# Patient Record
Sex: FEMALE | Race: WHITE | HISPANIC OR LATINO | Employment: STUDENT | URBAN - METROPOLITAN AREA
[De-identification: names, ages, dates, MRNs, and addresses within clinical notes are randomized per-mention and may not be internally consistent; named-entity substitution may affect disease eponyms.]

---
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
## 2023-01-12 ENCOUNTER — OFFICE VISIT (OUTPATIENT)
Dept: OBGYN CLINIC | Facility: CLINIC | Age: 18
End: 2023-01-12

## 2023-01-12 ENCOUNTER — NURSE TRIAGE (OUTPATIENT)
Dept: OTHER | Facility: OTHER | Age: 18
End: 2023-01-12

## 2023-01-12 VITALS
DIASTOLIC BLOOD PRESSURE: 64 MMHG | SYSTOLIC BLOOD PRESSURE: 122 MMHG | BODY MASS INDEX: 23.75 KG/M2 | HEIGHT: 61 IN | WEIGHT: 125.8 LBS

## 2023-01-12 DIAGNOSIS — R10.2 PELVIC PAIN: ICD-10-CM

## 2023-01-12 DIAGNOSIS — N94.9 VAGINAL DISCOMFORT: ICD-10-CM

## 2023-01-12 DIAGNOSIS — R39.9 URINARY SYMPTOM OR SIGN: Primary | ICD-10-CM

## 2023-01-12 DIAGNOSIS — Z30.09 ENCOUNTER FOR OTHER GENERAL COUNSELING AND ADVICE ON CONTRACEPTION: ICD-10-CM

## 2023-01-12 DIAGNOSIS — Z11.3 SCREEN FOR STD (SEXUALLY TRANSMITTED DISEASE): ICD-10-CM

## 2023-01-12 PROBLEM — G43.109 MIGRAINE WITH AURA: Status: ACTIVE | Noted: 2023-01-12

## 2023-01-12 RX ORDER — NITROFURANTOIN 25; 75 MG/1; MG/1
100 CAPSULE ORAL 2 TIMES DAILY
Qty: 10 CAPSULE | Refills: 0 | Status: SHIPPED | OUTPATIENT
Start: 2023-01-12 | End: 2023-01-17

## 2023-01-12 NOTE — PROGRESS NOTES
Assessment/Plan:       Diagnoses and all orders for this visit:    Urinary symptom or sign  -     nitrofurantoin (MACROBID) 100 mg capsule; Take 1 capsule (100 mg total) by mouth 2 (two) times a day for 5 days  -     Urine culture    Pelvic pain  -     Chlamydia/GC amplified DNA by PCR  -     VAGINOSIS DNA PROBE (AFFIRM)    Vaginal discomfort  -     VAGINOSIS DNA PROBE (AFFIRM)    Screen for STD (sexually transmitted disease)  -     Chlamydia/GC amplified DNA by PCR  -     VAGINOSIS DNA PROBE (AFFIRM)    Encounter for other general counseling and advice on contraception      20-year-old female presenting today for urinary and vulvovaginal symptoms as in HPI  Patient report recent unprotected sex for the first time  Primary complaint urinary symptoms, relief with Azo suggesting possible UTI  Urine dip not performed secondary to discoloration of urine from Azo  We will send out urine culture  Will start patient on Macrobid twice daily x5 days  Patient also noting some vulvovaginal symptoms  Some mild erythema without lesion or swelling noted at vaginal introitus, slightly at urethral opening without pus or discharge  Possibly from friction/irritation first time penetration? Recommend affirm and GC/CT cultures to rule out infection  Patient agreeable  Will treat according to results  Discussion had with patient regarding contraception and STD prevention  Patient interested in IUD  All forms of prescribe birth control including oral, ring, patch, Depo-Provera, IUD Nexplanon discussed with risk versus benefits, possible side effects, contraindications, expected bleeding patterns and procedure all reviewed with her today  Patient interested in possible copper IUD, which was discussed in further detail with patient  Patient does report migraine at times with visual floaters concerning for aura, would not advise combination hormonal at this time    She states she does have an appointment to discuss further with her neurologist   Patient also found to have pituitary mass/suspected microadenoma and is also following up with neurology for this as well  Neg hormonal workup last year  Patient given information handout for Devorah Mueller and Miracle Martinez as well as information for contacting insurance to ensure coverage once she makes a decision  Then she can contact office to schedule appointment for procedure  Also discussed with patient that prescription contraception does not protect against STDs and consistent condom use recommended  All patient questions answered  Will plan follow-up with patient once test results are back and appropriate treatment given  Patient to schedule appointment after deciding contraception method  Patient satisfied  Chief Complaint   Patient presents with   • UTI symptoms     Pt c/o pressure after voiding, urinary urgency, pelvic pain and lower back pain, pt also c/o vaginal discomfort  • Pelvic Pain       Subjective:      Patient ID: Paolo Bui is a 21 y o  female  24y/o F here today for concern for UTI sxs and vaginal sxs  She is new to our practice but has been seen within [address] [place] [address] OB/GYN and [place] previously  States had unprotected sex for first time a few days ago  Since then having urinary sxs, urgency, cloudy urine, odor in urine, frequency, pressure/discomfort, nausea  States has discomfort in vagina  Denies odor or abnormal discharge  Some pelvic cramping/pain  Had unprotected sex fr first time, no condom use  Not using anything for contraception  Is interested in possible IUD  She is taking Azo since last night  Giving some relief  The following portions of the patient's history were reviewed and updated as appropriate: allergies, current medications, past medical history, past social history and problem list     Review of Systems   Constitutional: Negative  Respiratory: Negative  Cardiovascular: Negative  Gastrointestinal: Negative  Genitourinary:        As in HPI         Objective:      /64 (BP Location: Left arm, Patient Position: Sitting, Cuff Size: Adult)   Ht 5' 1" (1 549 m)   Wt 57 1 kg (125 lb 12 8 oz)   LMP 01/06/2023 (Exact Date)   BMI 23 77 kg/m²          Physical Exam  Vitals reviewed  Constitutional:       Appearance: Normal appearance  Abdominal:      General: Abdomen is flat  Palpations: Abdomen is soft  Tenderness: There is no abdominal tenderness  Genitourinary:     Labia:         Right: No rash or lesion  Left: No rash or lesion  Vagina: Vaginal discharge (minimal thin yellow/green discharge) and tenderness (minimal generalized vaginal discomfort with speculum exam  ) present  No erythema or bleeding  Cervix: Erythema (minimal erythema noted localized around cervical os) present  No cervical motion tenderness, discharge, friability, lesion or cervical bleeding  Uterus: Not tender  Adnexa:         Right: No tenderness  Left: No tenderness  Comments: Minimal erythema at vaginal introitus without lesion, bleeding or open sores  Also minimal erythema noted at urethral opening no discharge noted  Pt experiencing minimal discomfort with speculum exam  Lymphadenopathy:      Head:      Right side of head: No submandibular or tonsillar adenopathy  Left side of head: No submandibular or tonsillar adenopathy  Neurological:      Mental Status: She is alert and oriented to person, place, and time  Psychiatric:         Mood and Affect: Mood normal          Behavior: Behavior normal  Behavior is cooperative

## 2023-01-12 NOTE — TELEPHONE ENCOUNTER
Patient was transferred to the office to schedule an appointment  Reason for Disposition  • All other females with painful urination, or patient wants to be seen    Answer Assessment - Initial Assessment Questions  1  SEVERITY: "How bad is the pain?"  (e g , Scale 1-10; mild, moderate, or severe)    - MILD (1-3): complains slightly about urination hurting    - MODERATE (4-7): interferes with normal activities      - SEVERE (8-10): excruciating, unwilling or unable to urinate because of the pain       "It is just really strong discomfort "   2  FREQUENCY: "How many times have you had painful urination today?"       2  3  PATTERN: "Is pain present every time you urinate or just sometimes?"       "Just after I am done peeing I get the discomfort and then it goes away "   4  ONSET: "When did the painful urination start?"       Monday   5  FEVER: "Do you have a fever?" If Yes, ask: "What is your temperature, how was it measured, and when did it start?"      Chills, has not taken temperature   6  PAST UTI: "Have you had a urine infection before?" If Yes, ask: "When was the last time?" and "What happened that time?"       "A lot time ago when I was younger "   7  CAUSE: "What do you think is causing the painful urination?"  (e g , UTI, scratch, Herpes sore)      UTI   8  OTHER SYMPTOMS: "Do you have any other symptoms?" (e g , flank pain, vaginal discharge, genital sores, urgency, blood in urine)      Strong odor, cloudy, urgency, frequency, abdominal cramping, lower back pain, blood in urine "I have been getting off my period  I am assuming that is why "   9   PREGNANCY: "Is there any chance you are pregnant?" "When was your last menstrual period?"      Last week     Started taking Azo last night    Protocols used: URINATION PAIN - FEMALE-ADULT-OH

## 2023-01-12 NOTE — TELEPHONE ENCOUNTER
Regarding: possible UTI  ----- Message from Betzaida Mckee sent at 1/12/2023  8:20 AM EST -----  " I am having discomfort and frequency with urination, cloudy and smelly urine   I would like an appt today "

## 2023-01-13 LAB
C TRACH DNA SPEC QL NAA+PROBE: NEGATIVE
CANDIDA RRNA VAG QL PROBE: NEGATIVE
G VAGINALIS RRNA GENITAL QL PROBE: POSITIVE
N GONORRHOEA DNA SPEC QL NAA+PROBE: NEGATIVE
T VAGINALIS RRNA GENITAL QL PROBE: NEGATIVE

## 2023-01-14 LAB — BACTERIA UR CULT: ABNORMAL

## 2023-01-16 DIAGNOSIS — B96.89 BV (BACTERIAL VAGINOSIS): Primary | ICD-10-CM

## 2023-01-16 DIAGNOSIS — N76.0 BV (BACTERIAL VAGINOSIS): Primary | ICD-10-CM

## 2023-01-16 RX ORDER — METRONIDAZOLE 7.5 MG/G
1 GEL VAGINAL
Qty: 70 G | Refills: 0 | Status: SHIPPED | OUTPATIENT
Start: 2023-01-16 | End: 2023-01-21

## 2023-01-24 DIAGNOSIS — R39.9 URINARY SYMPTOM OR SIGN: Primary | ICD-10-CM

## 2023-01-24 RX ORDER — NITROFURANTOIN 25; 75 MG/1; MG/1
100 CAPSULE ORAL 2 TIMES DAILY
Qty: 10 CAPSULE | Refills: 0 | Status: SHIPPED | OUTPATIENT
Start: 2023-01-24 | End: 2023-01-29

## 2023-02-17 DIAGNOSIS — B96.89 BV (BACTERIAL VAGINOSIS): Primary | ICD-10-CM

## 2023-02-17 DIAGNOSIS — N76.0 BV (BACTERIAL VAGINOSIS): Primary | ICD-10-CM

## 2023-02-17 RX ORDER — METRONIDAZOLE 7.5 MG/G
1 GEL VAGINAL
Qty: 70 G | Refills: 0 | Status: SHIPPED | OUTPATIENT
Start: 2023-02-17 | End: 2023-02-22

## 2023-02-22 ENCOUNTER — OFFICE VISIT (OUTPATIENT)
Dept: OBGYN CLINIC | Facility: CLINIC | Age: 18
End: 2023-02-22

## 2023-02-22 VITALS
WEIGHT: 128.2 LBS | DIASTOLIC BLOOD PRESSURE: 60 MMHG | HEIGHT: 61 IN | BODY MASS INDEX: 24.2 KG/M2 | SYSTOLIC BLOOD PRESSURE: 122 MMHG

## 2023-02-22 DIAGNOSIS — Z11.3 SCREENING EXAMINATION FOR STD (SEXUALLY TRANSMITTED DISEASE): ICD-10-CM

## 2023-02-22 DIAGNOSIS — Z30.09 COUNSELING FOR BIRTH CONTROL REGARDING INTRAUTERINE DEVICE (IUD): Primary | ICD-10-CM

## 2023-02-22 DIAGNOSIS — N88.2 CERVICAL STENOSIS (UTERINE CERVIX): ICD-10-CM

## 2023-02-22 RX ORDER — MISOPROSTOL 200 UG/1
600 TABLET ORAL ONCE
Qty: 3 TABLET | Refills: 0 | Status: SHIPPED | OUTPATIENT
Start: 2023-02-22 | End: 2023-02-22

## 2023-02-22 NOTE — PROGRESS NOTES
Subjective      Terese Mcleod is a 21 y o  female who presents for contraception counseling  She is interested in the IUD and would like information on the difference between the options available  The patient has no complaints today  The patient is sexually active  Pertinent past medical history: none  Menstrual History:  OB History        0    Para   0    Term   0       0    AB   0    Living   0       SAB   0    IAB   0    Ectopic   0    Multiple   0    Live Births   0                  Patient's last menstrual period was 2023 (exact date)  The following portions of the patient's history were reviewed and updated as appropriate: allergies, current medications, past family history, past medical history, past social history, past surgical history and problem list     Review of Systems  Pertinent items are noted in HPI  Objective      /60   Ht 5' 1" (1 549 m)   Wt 58 2 kg (128 lb 3 2 oz)   LMP 2023 (Exact Date)   BMI 24 22 kg/m²       Assessment and Plan      Terese was seen today for contraception  Diagnoses and all orders for this visit:    Counseling for birth control regarding intrauterine device (IUD)    Cervical stenosis (uterine cervix)  -     miSOPROStol (Cytotec) 200 mcg tablet; Take 3 tablets (600 mcg total) by mouth 1 (one) time for 1 dose    Screening examination for STD (sexually transmitted disease)  -     Chlamydia/GC amplified DNA by PCR      Reviewed difference between Neville Edinson and Mirena  I explained they are all effective for prevention of pregnancy  She has opted for the Mirena IUD  She is due for her menses next week  Urine sent for STD screening  Cytotec prescribed for cervical ripening, to be take the night before insertion  She was also instructed to take 600 mg of Ibuprofen the night before and morning of insertion  As well as to ensure that she eats prior to her procedure  Patient will return on 3/2 for IUD insertion

## 2023-02-22 NOTE — Clinical Note
Please process a mirena IUD for patient  She will be returning next week Thursday 3/2 for insertion

## 2023-02-23 LAB
C TRACH DNA SPEC QL NAA+PROBE: NEGATIVE
N GONORRHOEA DNA SPEC QL NAA+PROBE: NEGATIVE

## 2023-02-27 ENCOUNTER — TELEPHONE (OUTPATIENT)
Dept: NEUROLOGY | Facility: CLINIC | Age: 18
End: 2023-02-27

## 2023-07-12 ENCOUNTER — APPOINTMENT (OUTPATIENT)
Dept: LAB | Facility: CLINIC | Age: 18
End: 2023-07-12
Payer: COMMERCIAL

## 2023-07-12 ENCOUNTER — OFFICE VISIT (OUTPATIENT)
Dept: OBGYN CLINIC | Facility: CLINIC | Age: 18
End: 2023-07-12

## 2023-07-12 VITALS — DIASTOLIC BLOOD PRESSURE: 74 MMHG | BODY MASS INDEX: 23.73 KG/M2 | SYSTOLIC BLOOD PRESSURE: 106 MMHG | WEIGHT: 125.6 LBS

## 2023-07-12 DIAGNOSIS — Z30.09 COUNSELING FOR INITIATION OF BIRTH CONTROL METHOD: ICD-10-CM

## 2023-07-12 DIAGNOSIS — Z11.3 SCREEN FOR STD (SEXUALLY TRANSMITTED DISEASE): ICD-10-CM

## 2023-07-12 DIAGNOSIS — N89.8 VAGINAL DISCHARGE: ICD-10-CM

## 2023-07-12 DIAGNOSIS — N76.0 ACUTE VAGINITIS: Primary | ICD-10-CM

## 2023-07-12 LAB
BV WHIFF TEST VAG QL: POSITIVE
CLUE CELLS SPEC QL WET PREP: POSITIVE
HBV SURFACE AG SER QL: NORMAL
HCV AB SER QL: NORMAL
HIV 1+2 AB+HIV1 P24 AG SERPL QL IA: NORMAL
HIV 2 AB SERPL QL IA: NORMAL
HIV1 AB SERPL QL IA: NORMAL
HIV1 P24 AG SERPL QL IA: NORMAL
PH SMN: 5 [PH]
T VAGINALIS VAG QL WET PREP: ABNORMAL
TREPONEMA PALLIDUM IGG+IGM AB [PRESENCE] IN SERUM OR PLASMA BY IMMUNOASSAY: NORMAL
YEAST VAG QL WET PREP: POSITIVE

## 2023-07-12 PROCEDURE — 87591 N.GONORRHOEAE DNA AMP PROB: CPT | Performed by: PHYSICIAN ASSISTANT

## 2023-07-12 PROCEDURE — 86803 HEPATITIS C AB TEST: CPT

## 2023-07-12 PROCEDURE — 36415 COLL VENOUS BLD VENIPUNCTURE: CPT

## 2023-07-12 PROCEDURE — 87491 CHLMYD TRACH DNA AMP PROBE: CPT | Performed by: PHYSICIAN ASSISTANT

## 2023-07-12 PROCEDURE — 87389 HIV-1 AG W/HIV-1&-2 AB AG IA: CPT

## 2023-07-12 PROCEDURE — 87340 HEPATITIS B SURFACE AG IA: CPT

## 2023-07-12 PROCEDURE — 86780 TREPONEMA PALLIDUM: CPT

## 2023-07-12 RX ORDER — FLUCONAZOLE 150 MG/1
TABLET ORAL
Qty: 2 TABLET | Refills: 0 | Status: SHIPPED | OUTPATIENT
Start: 2023-07-12 | End: 2023-07-17

## 2023-07-12 RX ORDER — METRONIDAZOLE 500 MG/1
500 TABLET ORAL EVERY 12 HOURS SCHEDULED
Qty: 14 TABLET | Refills: 0 | Status: SHIPPED | OUTPATIENT
Start: 2023-07-12 | End: 2023-07-19

## 2023-07-12 NOTE — PROGRESS NOTES
Terese Castroya Abdifatah [facility]  2005      CC:  "vaginal discharge" x 1 week     S:  21 y.o. female here with c/o thick white to yellow vaginal discharge, internal itching, and vaginal irritation. Reports faint odor, like something is off, but not fishy or metallic. No pelvic pain or urinary sx. Last sexually active in April 2023. Reports male partners only. Uses withdrawal for contraception. Is interested in being on birth control but would like to wait until she becomes active again. No current outpatient medications on file. Social History     Socioeconomic History   • Marital status: Single     Spouse name: Not on file   • Number of children: Not on file   • Years of education: Not on file   • Highest education level: Not on file   Occupational History   • Not on file   Tobacco Use   • Smoking status: Never   • Smokeless tobacco: Never   Vaping Use   • Vaping Use: Never used   Substance and Sexual Activity   • Alcohol use: Never   • Drug use: Never   • Sexual activity: Yes     Partners: Male     Birth control/protection: None   Other Topics Concern   • Not on file   Social History Narrative   • Not on file     Social Determinants of Health     Financial Resource Strain: Low Risk  (9/20/2022)    Overall Financial Resource Strain (CARDIA)    • Difficulty of Paying Living Expenses: Not hard at all   Food Insecurity: No Food Insecurity (9/20/2022)    Hunger Vital Sign    • Worried About Running Out of Food in the Last Year: Never true    • Ran Out of Food in the Last Year: Never true   Transportation Needs: No Transportation Needs (9/20/2022)    PRAPARE - Transportation    • Lack of Transportation (Medical): No    • Lack of Transportation (Non-Medical):  No   Physical Activity: Sufficiently Active (6/2/2022)    Exercise Vital Sign    • Days of Exercise per Week: 3 days    • Minutes of Exercise per Session: 60 min   Stress: No Stress Concern Present (6/2/2022)    [employer] Stress Questionnaire    • Feeling of Stress : Not at all   Social Connections: Socially Isolated (6/2/2022)    Social Connection and Isolation Panel [NHANES]    • Frequency of Communication with Friends and Family: More than three times a week    • Frequency of Social Gatherings with Friends and Family: More than three times a week    • Attends Mosque Services: Never    • Active Member of Clubs or Organizations: No    • Attends Club or Organization Meetings: Never    • Marital Status: Never    Intimate Partner Violence: Not At Risk (6/2/2022)    Humiliation, Afraid, Rape, and Kick questionnaire    • Fear of Current or Ex-Partner: No    • Emotionally Abused: No    • Physically Abused: No    • Sexually Abused: No   Housing Stability: Unknown (6/2/2022)    Housing Stability Vital Sign    • Unable to Pay for Housing in the Last Year: No    • Number of Places Lived in the Last Year: Not on file    • Unstable Housing in the Last Year: No     Family History   Problem Relation Age of Onset   • Breast cancer Mother    • Seizures Mother    • Migraines Mother    • No Known Problems Father    • No Known Problems Brother    • No Known Problems Brother    • Thyroid disease Maternal Grandmother    • Diabetes Maternal Grandmother    • Hypertension Maternal Grandmother    • Thyroid disease Paternal Aunt    • Diabetes Paternal Uncle    • Colon cancer Neg Hx    • Ovarian cancer Neg Hx      Past Medical History:   Diagnosis Date   • Anxiety    • Bipolar disorder ([address])    • Congenital jaw deformity     surgically corrected 2020   • Migraine    • Pituitary tumor    • Scoliosis        ROS:  Constitutional: Negative. Gastrointestinal: Negative for abdominal pain nausea, vomiting, and change in bowel habits. Genitourinary:  + discharge, itching, irritation, faint odor. Negative for difficulty urinating, frequency, urgency, pelvic pain.   O:  Blood pressure 106/74, weight 57 kg (125 lb 9.6 oz), last menstrual period 06/25/2023, not currently breastfeeding. Recent Results (from the past 1 hour(s))   POCT wet mount    Collection Time: 07/12/23  9:38 AM   Result Value Ref Range    Yeast, Wet Prep Positive     pH 5     Whiff Test Positive     Clue Cells Positive     Trich, Wet Prep Neg        Patient appears well and is not in distress  Normocephalic, atraumatic. Abdomen is soft and nontender without masses. External genitals are normal without lesions or rashes. Vagina with + thick white vaginal discharge. Pain/irritation reported on speculum exam. No bleeding or erythema. Cervix is normal without discharge or lesion. No CMT. Uterus is normal, mobile, nontender without palpable mass. Adnexa are normal, nontender, without palpable mass. No focal neurological deficits. Normal mood, affect, and behavior. A/P  Diagnoses and all orders for this visit:    Acute vaginitis    - metroNIDAZOLE (FLAGYL) 500 mg tablet; Take 1 tablet (500 mg total) by mouth every 12 (twelve) hours for 7 days  Dispense: 14 tablet; Refill: 0  - fluconazole (DIFLUCAN) 150 mg tablet; Take one tablet on day 4 of antibiotic, then one tablet 3 days later  Dispense: 2 tablet; Refill: 0    Reviewed findings of yeast and BV on wet mount. Rx for PO metronidazole and diflucan sent to pharmacy. Reviewed common side effects, including antabuse effect. Recommended no alcohol while taking. Take with food and probiotic. Vulvar hygiene reviewed. RTO in 1 week if sx persist, sooner if worsening. Screen for STD (sexually transmitted disease)  -     Chlamydia/GC amplified DNA by PCR  -     Hepatitis B surface antigen; Future  -     Hepatitis C antibody; Future  -     HIV 1/2 AG/AB w Reflex [facility] for 2 yr old and above; Future  -     RPR-Syphilis Screening (Total Syphilis IGG/IGM); Future    GC/CT testing collected. Will notify with results. Slip for blood work given. Condoms recommended for STD and pregnancy prevention.      Vaginal discharge  -     POCT wet [facility]    Counseling for initiation of birth control method    Denies migraine with aura (reporting only migraine sx no aura), however, has dx of this in chart. Because of this would not recommend estrogen containing contraception at this time. All available options reviewed. Most interested in an IUD, noting she has previously scheduled then cancelled insertion. Reviewed benefit of initiation prior to becoming sexually active again. Discussed efficacy of withdrawal compared to these options. Encouraged condoms and reviewed availability of Plan B pill. If decides on IUD she will call to schedule. Advised to refrain from intercourse until IUD insertion. She is aware that if she has unprotected sex in the 2 weeks leading up to placement of the IUD, the IUD will not be able to be placed at that time. She will call on day 1 of her period to schedule IUD placement. Instructed to premedicated with 600 mg of Motrin 30-60 minutes prior to the insertion. At conclusion of conversation, all questions were answered to her satisfaction.

## 2023-07-13 LAB
C TRACH DNA SPEC QL NAA+PROBE: NEGATIVE
N GONORRHOEA DNA SPEC QL NAA+PROBE: NEGATIVE

## 2023-07-17 ENCOUNTER — OFFICE VISIT (OUTPATIENT)
Dept: FAMILY MEDICINE CLINIC | Facility: CLINIC | Age: 18
End: 2023-07-17
Payer: COMMERCIAL

## 2023-07-17 VITALS
HEIGHT: 61 IN | WEIGHT: 125 LBS | HEART RATE: 67 BPM | SYSTOLIC BLOOD PRESSURE: 100 MMHG | BODY MASS INDEX: 23.6 KG/M2 | DIASTOLIC BLOOD PRESSURE: 70 MMHG | OXYGEN SATURATION: 100 % | TEMPERATURE: 98.6 F

## 2023-07-17 DIAGNOSIS — G43.109 MIGRAINE WITH AURA AND WITHOUT STATUS MIGRAINOSUS, NOT INTRACTABLE: ICD-10-CM

## 2023-07-17 DIAGNOSIS — E23.6 PITUITARY MASS (HCC): ICD-10-CM

## 2023-07-17 DIAGNOSIS — Z00.01 ENCOUNTER FOR WELL ADULT EXAM WITH ABNORMAL FINDINGS: Primary | ICD-10-CM

## 2023-07-17 PROCEDURE — 99214 OFFICE O/P EST MOD 30 MIN: CPT | Performed by: FAMILY MEDICINE

## 2023-07-17 PROCEDURE — 99395 PREV VISIT EST AGE 18-39: CPT | Performed by: FAMILY MEDICINE

## 2023-07-17 NOTE — PROGRESS NOTES
[address] [facility] PRIMARY CARE [facility]    NAME: Angelina Mcmahan  AGE: 21 y.o. SEX: female  : 2005     DATE: 2023     Assessment and Plan:     Problem List Items Addressed This Visit        Cardiovascular and Mediastinum    Migraine with aura     Chronic asymptomatic well-controlled managed conservatively Tylenol at the time of the pain been evaluated by neurology previously  - Maintain good sleep hygiene. Going to bed and waking up at consistent times, avoiding excessive daytime naps, avoiding caffeinated beverages in the evening, avoid excessive stimulation in the evening and generally using bed primarily for sleeping. One hour before bedtime would recommend turning lights down lower, decreasing your activity (may read quietly, listen to music at a low volume). When you get into bed, should eliminate all technology (no texting, emailing, playing with your phone, iPad or tablet in bed). - Maintain good hydration. Drink 2L of fluid a day (4 typical small water bottles)  - Maintain good nutrition. In particular don't skip meals and eat balanced meals regularly. Other    Pituitary mass [facility])     Patient with history of pituitary mass MRI of the brain from 2022 reviewed with the patient recommend to monitor it yearly by neurology plan to order MRI of the brain with and without contrast to check on the size         Relevant Orders    MRI brain w wo contrast    Encounter for well adult exam with abnormal findings - Primary     Advice and education were given regarding nutrition, aerobic exercises, weight-bearing exercises, cardiovascular risk reduction, fall risk reduction, and age-appropriate supplements.      The patient was counseled regarding instructions for management, risk factor reductions, prognosis, risks and benefits of treatment options, patient and family education, and importance of compliance with treatment. Immunizations and preventive care screenings were discussed with patient today. Appropriate education was printed on patient's after visit summary. Counseling:  Alcohol/drug use: discussed moderation in alcohol intake, the recommendations for healthy alcohol use, and avoidance of illicit drug use. Dental Health: discussed importance of regular tooth brushing, flossing, and dental visits. Injury prevention: discussed safety/seat belts, safety helmets, smoke detectors, carbon dioxide detectors, and smoking near bedding or upholstery. Sexual health: discussed sexually transmitted diseases, partner selection, use of condoms, avoidance of unintended pregnancy, and contraceptive alternatives. Exercise: the importance of regular exercise/physical activity was discussed. Recommend exercise 3-5 times per week for at least 30 minutes. Depression Screening and Follow-up Plan: Patient was screened for depression during today's encounter. They screened negative with a PHQ-2 score of 0. Return in about 1 year (around 7/17/2024). Chief Complaint:     Chief Complaint   Patient presents with   • Physical Exam      History of Present Illness:     Adult Annual Physical   Patient here for a comprehensive physical exam. The patient reports Follow-up with a chronic condition patient history of migraines migraine well controlled radiated to headache recently in the last couple weeks secondary to a change in the weather and pain with a deep and poor air quality she been getting headache about Tylenol and with hydration it helped patient also have history of pituitary mass evaluated by neurology previously note has been reviewed with the patient recommendation to watch it yearly. Diet and Physical Activity  Diet/Nutrition: no special diet. Exercise: 3-4 times a week on average.       Depression Screening  PHQ-2/9 Depression Screening    Little interest or pleasure in doing things: 0 - not at all  Feeling down, depressed, or hopeless: 0 - not at all  PHQ-2 Score: 0  PHQ-2 Interpretation: Negative depression screen       General Health  Sleep: sleeps well. Hearing: normal - bilateral.  Vision: wears glasses and contacts. Dental: regular dental visits. /GYN Health  Last menstrual period: 06/25/23  Contraceptive method: Candom. History of STDs?: no.     Review of Systems:     Review of Systems   Constitutional: Negative for chills and fever. HENT: Negative for ear pain and sore throat. Eyes: Negative for pain and visual disturbance. Respiratory: Negative for cough and shortness of breath. Cardiovascular: Negative for chest pain and palpitations. Gastrointestinal: Negative for abdominal pain, constipation, diarrhea, nausea and vomiting. Genitourinary: Negative for dysuria and hematuria. Musculoskeletal: Negative for arthralgias and back pain. Skin: Negative for color change and rash. Neurological: Negative for dizziness, tremors, seizures, syncope, speech difficulty, weakness and headaches. All other systems reviewed and are negative.      Past Medical History:     Past Medical History:   Diagnosis Date   • Anxiety    • Bipolar disorder ([address])    • Congenital jaw deformity     surgically corrected 2020   • Migraine    • Pituitary tumor    • Scoliosis       Past Surgical History:     Past Surgical History:   Procedure Laterality Date   • GENIOPLASTY Bilateral 9/8/2020    Procedure: [place] SAGGITTAL SPLIT OSTEOTOMY;  Surgeon: Krishna Chang DMD;  [facility];  Service: Maxillofacial   • MAXILLARY LE FORTE OSTEOTOMY Bilateral 9/8/2020    Procedure: OSTEOTOMY MAXILLARY Genice Speller I;  Surgeon: Krishna Chang DMD;  [facility];  Service: Maxillofacial   • MULTIPLE TOOTH EXTRACTIONS N/A 9/8/2020    Procedure: EXTRACTION TEETH IMPACTED, 1, 16, 16 ,32;  Surgeon: Krishna Chang DMD;  [facility];  Service: Maxillofacial   • TONSILLECTOMY 2009      Social History:     Social History     Socioeconomic History   • Marital status: Single     Spouse name: None   • Number of children: None   • Years of education: None   • Highest education level: None   Occupational History   • None   Tobacco Use   • Smoking status: Never   • Smokeless tobacco: Never   Vaping Use   • Vaping Use: Never used   Substance and Sexual Activity   • Alcohol use: Never   • Drug use: Never   • Sexual activity: Yes     Partners: Male     Birth control/protection: None   Other Topics Concern   • None   Social History Narrative   • None     Social Determinants of Health     Financial Resource Strain: Low Risk  (9/20/2022)    Overall Financial Resource Strain (CARDIA)    • Difficulty of Paying Living Expenses: Not hard at all   Food Insecurity: No Food Insecurity (9/20/2022)    Hunger Vital Sign    • Worried About Running Out of Food in the Last Year: Never true    • Ran Out of Food in the Last Year: Never true   Transportation Needs: No Transportation Needs (9/20/2022)    PRAPARE - Transportation    • Lack of Transportation (Medical): No    • Lack of Transportation (Non-Medical):  No   Physical Activity: Sufficiently Active (6/2/2022)    Exercise Vital Sign    • Days of Exercise per Week: 3 days    • Minutes of Exercise per Session: 60 min   Stress: No Stress Concern Present (6/2/2022)    [facility]    • Feeling of Stress : Not at all   Social Connections: Socially Isolated (6/2/2022)    Social Connection and Isolation Panel [NHANES]    • Frequency of Communication with Friends and Family: More than three times a week    • Frequency of Social Gatherings with Friends and Family: More than three times a week    • Attends Pentecostalism Services: Never    • Active Member of Clubs or Organizations: No    • Attends Club or Organization Meetings: Never    • Marital Status: Never    Intimate Partner Violence: Not At Risk (6/2/2022)    Humiliation, Afraid, Rape, and Kick questionnaire    • Fear of Current or Ex-Partner: No    • Emotionally Abused: No    • Physically Abused: No    • Sexually Abused: No   Housing Stability: Unknown (6/2/2022)    Housing Stability Vital Sign    • Unable to Pay for Housing in the Last Year: No    • Number of Places Lived in the Last Year: Not on file    • Unstable Housing in the Last Year: No      Family History:     Family History   Problem Relation Age of Onset   • Breast cancer Mother    • Seizures Mother    • Migraines Mother    • No Known Problems Father    • No Known Problems Brother    • No Known Problems Brother    • Thyroid disease Maternal Grandmother    • Diabetes Maternal Grandmother    • Hypertension Maternal Grandmother    • Thyroid disease Paternal Aunt    • Diabetes Paternal Uncle    • Colon cancer Neg Hx    • Ovarian cancer Neg Hx       Current Medications:     Current Outpatient Medications   Medication Sig Dispense Refill   • metroNIDAZOLE (FLAGYL) 500 mg tablet Take 1 tablet (500 mg total) by mouth every 12 (twelve) hours for 7 days 14 tablet 0     No current facility-administered medications for this visit. Allergies: Allergies   Allergen Reactions   • Zithromax [Azithromycin]       Physical Exam:     /70 (BP Location: Left arm, Patient Position: Sitting)   Pulse 67   Temp 98.6 °F (37 °C) (Tympanic)   Ht 5' 1" (1.549 m)   Wt 56.7 kg (125 lb)   LMP 06/25/2023 (Exact Date)   SpO2 100%   Breastfeeding No   BMI 23.62 kg/m²     Physical Exam  Vitals and nursing note reviewed. Constitutional:       General: She is not in acute distress. Appearance: She is well-developed. HENT:      Head: Normocephalic and atraumatic. Right Ear: Tympanic membrane normal. There is no impacted cerumen. Left Ear: Tympanic membrane normal. There is no impacted cerumen. Nose: No rhinorrhea. Mouth/Throat:      Pharynx: No posterior oropharyngeal erythema.    Eyes: General:         Right eye: No discharge. Left eye: No discharge. Conjunctiva/sclera: Conjunctivae normal.   Cardiovascular:      Rate and Rhythm: Normal rate and regular rhythm. Heart sounds: No murmur heard. Pulmonary:      Effort: Pulmonary effort is normal. No respiratory distress. Breath sounds: Normal breath sounds. Abdominal:      Palpations: Abdomen is soft. Tenderness: There is no abdominal tenderness. Musculoskeletal:         General: No swelling. Cervical back: Neck supple. Right lower leg: No edema. Left lower leg: No edema. Skin:     General: Skin is warm and dry. Capillary Refill: Capillary refill takes less than 2 seconds. Findings: No erythema or rash. Neurological:      Mental Status: She is alert and oriented to person, place, and time.    Psychiatric:         Mood and Affect: Mood normal.          Wesley Lambert MD   [address]

## 2023-07-18 NOTE — ASSESSMENT & PLAN NOTE
Chronic asymptomatic well-controlled managed conservatively Tylenol at the time of the pain been evaluated by neurology previously  - Maintain good sleep hygiene. Going to bed and waking up at consistent times, avoiding excessive daytime naps, avoiding caffeinated beverages in the evening, avoid excessive stimulation in the evening and generally using bed primarily for sleeping. One hour before bedtime would recommend turning lights down lower, decreasing your activity (may read quietly, listen to music at a low volume). When you get into bed, should eliminate all technology (no texting, emailing, playing with your phone, iPad or tablet in bed). - Maintain good hydration. Drink 2L of fluid a day (4 typical small water bottles)  - Maintain good nutrition. In particular don't skip meals and eat balanced meals regularly.

## 2023-07-18 NOTE — ASSESSMENT & PLAN NOTE
Patient with history of pituitary mass MRI of the brain from December 2022 reviewed with the patient recommend to monitor it yearly by neurology plan to order MRI of the brain with and without contrast to check on the size

## 2023-08-03 ENCOUNTER — TELEPHONE (OUTPATIENT)
Dept: OBGYN CLINIC | Facility: CLINIC | Age: 18
End: 2023-08-03

## 2023-08-04 ENCOUNTER — TELEPHONE (OUTPATIENT)
Dept: NEUROLOGY | Facility: CLINIC | Age: 18
End: 2023-08-04

## 2023-08-04 NOTE — TELEPHONE ENCOUNTER
Patient wants to set up an appointment with Dr Indira Kapoor for December after her MRI. The primary doctor advised her to set up the appointment because of the busy schedule the doctors have. I informed her we usually call with the result but she insisted to make the appointment. I told her will ask the MA if she needs one.

## 2023-08-16 ENCOUNTER — APPOINTMENT (OUTPATIENT)
Dept: LAB | Facility: HOSPITAL | Age: 18
End: 2023-08-16
Payer: COMMERCIAL

## 2023-08-16 DIAGNOSIS — D35.2 PITUITARY ADENOMA (HCC): ICD-10-CM

## 2023-08-16 DIAGNOSIS — D35.2 PITUITARY ADENOMA (HCC): Primary | ICD-10-CM

## 2023-08-16 PROCEDURE — 84146 ASSAY OF PROLACTIN: CPT

## 2023-08-16 PROCEDURE — 84443 ASSAY THYROID STIM HORMONE: CPT

## 2023-08-16 PROCEDURE — 84403 ASSAY OF TOTAL TESTOSTERONE: CPT

## 2023-08-16 PROCEDURE — 84402 ASSAY OF FREE TESTOSTERONE: CPT

## 2023-08-17 ENCOUNTER — APPOINTMENT (OUTPATIENT)
Dept: LAB | Facility: CLINIC | Age: 18
End: 2023-08-17

## 2023-08-17 LAB
PROLACTIN SERPL-MCNC: 35.49 NG/ML (ref 3.34–26.72)
TSH SERPL DL<=0.05 MIU/L-ACNC: 1.96 UIU/ML (ref 0.45–4.5)

## 2023-08-18 LAB
TESTOST FREE SERPL-MCNC: 0.8 PG/ML
TESTOST SERPL-MCNC: 30 NG/DL (ref 13–71)

## 2023-08-22 ENCOUNTER — HOSPITAL ENCOUNTER (OUTPATIENT)
Dept: RADIOLOGY | Facility: HOSPITAL | Age: 18
Discharge: HOME | End: 2023-08-22
Payer: COMMERCIAL

## 2023-08-22 DIAGNOSIS — E23.6 PITUITARY MASS (HCC): ICD-10-CM

## 2023-08-22 PROCEDURE — G1004 CDSM NDSC: HCPCS

## 2023-08-22 PROCEDURE — 70553 MRI BRAIN STEM W/O & W/DYE: CPT

## 2023-08-22 PROCEDURE — A9585 GADOBUTROL INJECTION: HCPCS | Performed by: FAMILY MEDICINE

## 2023-08-22 RX ORDER — GADOBUTROL 604.72 MG/ML
5 INJECTION INTRAVENOUS
Status: COMPLETED | OUTPATIENT
Start: 2023-08-22 | End: 2023-08-22

## 2023-08-22 RX ADMIN — GADOBUTROL 5 ML: 604.72 INJECTION INTRAVENOUS at 11:15

## 2023-08-29 ENCOUNTER — TELEPHONE (OUTPATIENT)
Dept: NEUROSURGERY | Facility: CLINIC | Age: 18
End: 2023-08-29

## 2023-08-30 NOTE — TELEPHONE ENCOUNTER
8/30/23 CALLED PT MOM, FLACO ON MED COMMUN CONSENT, AND LMOM FOR HER TO CB TO DISCUSS NEED FOR REFERRAL AND BRAIN INTAKE.  CALLED PT CELL AND NO VM SET UP.  MRI BRAIN PIT 8/22/23 SL (7MM RATHKE'S CLEFT CYST)

## 2023-08-31 ENCOUNTER — TELEPHONE (OUTPATIENT)
Dept: NEUROSURGERY | Facility: CLINIC | Age: 18
End: 2023-08-31

## 2023-08-31 ENCOUNTER — TELEPHONE (OUTPATIENT)
Dept: FAMILY MEDICINE CLINIC | Facility: CLINIC | Age: 18
End: 2023-08-31

## 2023-08-31 DIAGNOSIS — E23.6 RATHKE'S CLEFT CYST (HCC): Primary | ICD-10-CM

## 2023-08-31 NOTE — TELEPHONE ENCOUNTER
----- Message from Mal Jauregui MD sent at 8/28/2023  8:50 PM EDT -----  Rathke's cleft cyst   Refer patient to see Neurosurgeon Chief Complaint   Patient presents with    Medication Evaluation     amlodipine     1. Have you been to the ER, urgent care clinic since your last visit? Hospitalized since your last visit? No    2. Have you seen or consulted any other health care providers outside of the [address] [place] [address] since your last visit? Include any pap smears or colon screening.  No

## 2023-08-31 NOTE — TELEPHONE ENCOUNTER
Left message to contact office referral entered for neurosurgeon Dr Shena Guzmán at [phone] to be evaluated for a Rathke's Cleft cyst found on Brain MRI benign non cancerous growth

## 2023-08-31 NOTE — TELEPHONE ENCOUNTER
Pt called and I sent teams message   please call pt  if we can after 1p on 9/1  Brain intake, other number was moms phone

## 2023-09-06 ENCOUNTER — TELEPHONE (OUTPATIENT)
Dept: NEUROSURGERY | Facility: CLINIC | Age: 18
End: 2023-09-06

## 2023-09-15 ENCOUNTER — CONSULT (OUTPATIENT)
Dept: NEUROSURGERY | Facility: CLINIC | Age: 18
End: 2023-09-15
Payer: COMMERCIAL

## 2023-09-15 VITALS
OXYGEN SATURATION: 98 % | BODY MASS INDEX: 23.6 KG/M2 | WEIGHT: 125 LBS | SYSTOLIC BLOOD PRESSURE: 114 MMHG | HEIGHT: 61 IN | DIASTOLIC BLOOD PRESSURE: 68 MMHG | TEMPERATURE: 98.2 F | HEART RATE: 74 BPM

## 2023-09-15 DIAGNOSIS — E23.6 RATHKE'S CLEFT CYST (HCC): Primary | ICD-10-CM

## 2023-09-15 PROCEDURE — 99204 OFFICE O/P NEW MOD 45 MIN: CPT | Performed by: NEUROLOGICAL SURGERY

## 2023-09-15 NOTE — PROGRESS NOTES
Neurosurgery   [address] [facility] 19 y.o. female MRN: [id number]      Assessment/Plan   1. 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 NO change since 2016 per MRI report  I explained that this is an incidental finding, meaning it is not causing any symptoms for her. PRL 35 mildly elevated, not c/w PRL tumor. I will refer to endocrinology for further workup    All questions answered    Chief Complaint:    Missed menstrual period in July. HPI    Happens occasionally. PRL mildly elevated  No visual field deficits. See eye doctor regularly. Unsure if formal visual fields done. Visual acuity changes requiring new glasses annually        ROS  ROS personally reviewed and updated. Review of Systems    Vitals: There were no vitals taken for this visit. Past Medical History:   Diagnosis Date   • Anxiety    • Bipolar disorder ([address])    • Congenital jaw deformity     surgically corrected 2020   • Migraine    • Pituitary tumor    • Scoliosis        Past Surgical History:   Procedure Laterality Date   • GENIOPLASTY Bilateral 9/8/2020    Procedure: [place] Need[place] SAGGITTAL SPLIT OSTEOTOMY;  Surgeon: Tania Mijares DMD;  [facility];  Service: Maxillofacial   • MAXILLARY LE FORTE OSTEOTOMY Bilateral 9/8/2020    Procedure: OSTEOTOMY MAXILLARY Marilynne De Leon I;  Surgeon: Tania Mijares DMD;  [facility];  Service: Maxillofacial   • MULTIPLE TOOTH EXTRACTIONS N/A 9/8/2020    Procedure: EXTRACTION TEETH IMPACTED, 1, 16, 16 ,32;  Surgeon: Tania Mijares DMD;  [facility];  Service: Maxillofacial   • TONSILLECTOMY  2009       No current outpatient medications on file.       Allergies   Allergen Reactions   • Zithromax [Azithromycin]        Social History     Socioeconomic History   • Marital status: Single     Spouse name: Not on file   • Number of children: Not on file   • Years of education: Not on file   • Highest education level: Not on file   Occupational History   • Not on file   Tobacco Use   • Smoking status: Never   • Smokeless tobacco: Never   Vaping Use   • Vaping Use: Never used   Substance and Sexual Activity   • Alcohol use: Never   • Drug use: Never   • Sexual activity: Yes     Partners: Male     Birth control/protection: None   Other Topics Concern   • Not on file   Social History Narrative   • Not on file     Social Determinants of Health     Financial Resource Strain: Low Risk  (9/20/2022)    Overall Financial Resource Strain (CARDIA)    • Difficulty of Paying Living Expenses: Not hard at all   Food Insecurity: No Food Insecurity (9/20/2022)    Hunger Vital Sign    • Worried About Running Out of Food in the Last Year: Never true    • Ran Out of Food in the Last Year: Never true   Transportation Needs: No Transportation Needs (9/20/2022)    PRAPARE - Transportation    • Lack of Transportation (Medical): No    • Lack of Transportation (Non-Medical):  No   Physical Activity: Sufficiently Active (6/2/2022)    Exercise Vital Sign    • Days of Exercise per Week: 3 days    • Minutes of Exercise per Session: 60 min   Stress: No Stress Concern Present (6/2/2022)    [address] [place] [address]    • Feeling of Stress : Not at all   Social Connections: Socially Isolated (6/2/2022)    Social Connection and Isolation Panel [NHANES]    • Frequency of Communication with Friends and Family: More than three times a week    • Frequency of Social Gatherings with Friends and Family: More than three times a week    • Attends Anabaptism Services: Never    • Active Member of Clubs or Organizations: No    • Attends Club or Organization Meetings: Never    • Marital Status: Never    Intimate Partner Violence: Not At Risk (6/2/2022)    Humiliation, Afraid, Rape, and Kick questionnaire    • Fear of Current or Ex-Partner: No    • Emotionally Abused: No    • Physically Abused: No    • Sexually Abused: No   Housing Stability: Unknown (6/2/2022) Housing Stability Vital Sign    • Unable to Pay for Housing in the Last Year: No    • Number of Places Lived in the Last Year: Not on file    • Unstable Housing in the Last Year: No            Imaging:  MRI Images and Report of the Brain show Rathke's cleft cyst, stable since 2016 per report      Physical Exam    Well-developed well-nourished  Appears stated age of 19 y.o.   Awake alert oriented x3  Verbally interactive and appropriate  Adequate fund of knowledge  Cranial nerves II through VIII intact   No gross visual field deficits  Motor strength 5 out of 5   No pronator drift  Sensory intact to light touch  Reflexes 2+and symmetric  Toes: downgoing  Gait: WNL  Memory: intact  Mood: Euthymic affect: Full range  Language: Fluent in [place]

## 2023-09-15 NOTE — PROGRESS NOTES
Review of Systems   Constitutional: Negative. HENT: Negative. Eyes: Negative. Respiratory: Negative. Cardiovascular: Negative. Gastrointestinal: Negative. Endocrine: Negative. Genitourinary: Negative. Musculoskeletal: Negative. Skin: Negative. Allergic/Immunologic: Negative. Neurological: Positive for headaches (H/o Migraines). NP-- 7MM Rathke's cleft cyst   L/S MRI ON  8/22/23    No AC/Non smoker/NO PREVIOUS SX     Hematological: Negative. Psychiatric/Behavioral: Negative. All other systems reviewed and are negative. No current outpatient medications on file.

## 2023-09-20 ENCOUNTER — TELEPHONE (OUTPATIENT)
Dept: NEUROLOGY | Facility: CLINIC | Age: 18
End: 2023-09-20

## 2023-09-20 NOTE — TELEPHONE ENCOUNTER
LMOM for patient offering earlier appts tomorrow and next week with Dr. Simi Caicedo in [facility]

## 2023-11-20 ENCOUNTER — TELEPHONE (OUTPATIENT)
Dept: OBGYN CLINIC | Facility: CLINIC | Age: 18
End: 2023-11-20

## 2023-11-20 DIAGNOSIS — B37.9 CANDIDIASIS: ICD-10-CM

## 2023-11-20 DIAGNOSIS — B37.9 YEAST INFECTION: Primary | ICD-10-CM

## 2023-11-20 NOTE — TELEPHONE ENCOUNTER
Patient is experiencing itching, burning and discharge. Possible Portuguese. Looking for a call back to see if it can be treated?

## 2023-11-20 NOTE — TELEPHONE ENCOUNTER
Patient last seen 7/12 by Merry Murillo for vaginitis  Began 4 days ago with white/yellow cottage cheese like discharge,and external burning  Requesting prescription and prefers the gel  Order placed for Terazol 3 if agree  Instructions given

## 2023-11-24 ENCOUNTER — TELEPHONE (OUTPATIENT)
Dept: NEUROLOGY | Facility: CLINIC | Age: 18
End: 2023-11-24

## 2023-12-01 ENCOUNTER — TELEPHONE (OUTPATIENT)
Dept: NEUROLOGY | Facility: CLINIC | Age: 18
End: 2023-12-01

## 2023-12-01 NOTE — TELEPHONE ENCOUNTER
[facility] for patient offering earlier appointment times off the wait list for this coming Monday 12/4/23 with Dr. Ambrose Nascimento in [facility]

## 2023-12-04 NOTE — TELEPHONE ENCOUNTER
OM offering patient earlier appointment tomorrow, 12/5/23 at 10 am with Dr. Ernesto Carney in [facility].

## 2023-12-11 ENCOUNTER — TELEPHONE (OUTPATIENT)
Dept: NEUROLOGY | Facility: CLINIC | Age: 18
End: 2023-12-11

## 2023-12-18 ENCOUNTER — TELEPHONE (OUTPATIENT)
Dept: OBGYN CLINIC | Facility: CLINIC | Age: 18
End: 2023-12-18

## 2023-12-18 NOTE — TELEPHONE ENCOUNTER
----- Message from Monserrat Rowland sent at 12/18/2023  9:05 AM EST -----  Regarding: f/u call back to patient  Hi.  Just in case you got busy and needed the info.  Patient returning your call.  I told her you would call back.     Thank you .  Have a good day

## 2023-12-18 NOTE — TELEPHONE ENCOUNTER
Patient stopped using the terzol because she got her period, has maybe 1 day left. She is still having vaginal itching, advised she try monistat cream for the full 7 days

## 2023-12-29 ENCOUNTER — TELEPHONE (OUTPATIENT)
Dept: OBGYN CLINIC | Facility: CLINIC | Age: 18
End: 2023-12-29

## 2023-12-29 ENCOUNTER — TELEPHONE (OUTPATIENT)
Age: 18
End: 2023-12-29

## 2023-12-29 ENCOUNTER — OFFICE VISIT (OUTPATIENT)
Dept: OBGYN CLINIC | Facility: CLINIC | Age: 18
End: 2023-12-29
Payer: COMMERCIAL

## 2023-12-29 VITALS
BODY MASS INDEX: 23.6 KG/M2 | DIASTOLIC BLOOD PRESSURE: 70 MMHG | HEIGHT: 61 IN | SYSTOLIC BLOOD PRESSURE: 116 MMHG | WEIGHT: 125 LBS

## 2023-12-29 DIAGNOSIS — N89.8 VAGINAL DISCHARGE: Primary | ICD-10-CM

## 2023-12-29 PROCEDURE — 81514 NFCT DS BV&VAGINITIS DNA ALG: CPT | Performed by: OBSTETRICS & GYNECOLOGY

## 2023-12-29 PROCEDURE — 99214 OFFICE O/P EST MOD 30 MIN: CPT | Performed by: OBSTETRICS & GYNECOLOGY

## 2023-12-29 NOTE — TELEPHONE ENCOUNTER
Received call from patient requesting New Patient appointment for ITCHING ON BOTH ARMS.    Explained wait/cancellation list processe's and MYCHART notification. Understanding was verbalized.    Created wait/cancellation list for patient.

## 2023-12-29 NOTE — TELEPHONE ENCOUNTER
Patient has discharge and has itchiness , no appointments are available [facility]

## 2023-12-29 NOTE — PATIENT INSTRUCTIONS
Topic: Vaginal discharge    Patient complaining of chronic discharge.  Recently treated with Terazol and followed by generic Monistat for 7 days.    Molecular panel obtained at today's visit    Further treatment and follow-up planning will be based upon results    All questions answered for patient at today's visit    Patient to call for any problems, questions, issues or concerns which may arise for her

## 2023-12-29 NOTE — PROGRESS NOTES
Assessment/Plan:    No problem-specific Assessment & Plan notes found for this encounter.       Diagnoses and all orders for this visit:    Vaginal discharge  -     Molecular Vaginal Panel          Subjective:      Patient ID: Terese Mac is a 18 y.o. female.    Patient is a 18-year-old female who presents today for follow-up treatment of vaginal discharge.    She had been seen recently and had initially been treated with Terazol.  She still continued to have a light discharge and this was then again treated with 7 days of store brand Monistat.  Patient reports that she is still experiencing some light discharge with irritation.    I advised her that we will take a complete vaginal culture panel today to ensure that she may not have another infection other than yeast.    Patient reports normal menstrual cycles.  She denies any severe bleeding and also denies any significant pelvic or abdominal cramping.    She also reports normal bowel bladder habits    She denies any fever shakes or chills    All questions were answered for her during today's visit    Further treatment and follow-up planning will be based upon culture results    Patient told to call for any problems, questions, issues or concerns which may arise for her.      Total time of today's visit was 25 minutes of which greater than 50% was spent face-to-face counseling the patient as well as coordination of care, review of chart and lab values, physical examination as well as computer entry into the [facility] medical record system.          The following portions of the patient's history were reviewed and updated as appropriate: allergies, current medications, past family history, past medical history, past social history, past surgical history, and problem list.    Review of Systems   Constitutional: Negative.  Negative for appetite change, diaphoresis, fatigue, fever and unexpected weight change.   HENT: Negative.     Eyes: Negative.    Respiratory:  "Negative.     Cardiovascular: Negative.    Gastrointestinal: Negative.  Negative for abdominal pain, blood in stool, constipation, diarrhea, nausea and vomiting.   Endocrine: Negative.  Negative for cold intolerance and heat intolerance.   Genitourinary: Negative.  Negative for dysuria, frequency, hematuria, urgency, vaginal bleeding, vaginal discharge and vaginal pain.   Musculoskeletal: Negative.    Skin: Negative.    Allergic/Immunologic: Negative.    Neurological: Negative.    Hematological: Negative.  Negative for adenopathy.   Psychiatric/Behavioral: Negative.           Objective:      /70   Ht 5' 1\" (1.549 m)   Wt 56.7 kg (125 lb)   LMP 11/30/2023 (Exact Date)   BMI 23.62 kg/m²          Physical Exam  Constitutional:       Appearance: She is well-developed.   HENT:      Head: Normocephalic.   Eyes:      Pupils: Pupils are equal, round, and reactive to light.   Cardiovascular:      Rate and Rhythm: Normal rate.   Pulmonary:      Effort: Pulmonary effort is normal.   Abdominal:      Palpations: Abdomen is soft.   Genitourinary:     General: Normal vulva.      Labia:         Right: No rash, tenderness, lesion or injury.         Left: No rash, tenderness, lesion or injury.       Urethra: No urethral swelling or urethral lesion.      Vagina: Vaginal discharge present. No erythema or lesions.      Cervix: No discharge, lesion, erythema or eversion.      Uterus: Normal. Not enlarged and not tender.       Adnexa: Right adnexa normal and left adnexa normal.        Right: No mass, tenderness or fullness.          Left: No mass, tenderness or fullness.     Musculoskeletal:         General: Normal range of motion.      Cervical back: Normal range of motion and neck supple.   Skin:     General: Skin is warm and dry.   Neurological:      General: No focal deficit present.      Mental Status: She is alert and oriented to person, place, and time.   Psychiatric:         Mood and Affect: Mood normal.         " Behavior: Behavior normal.         Thought Content: Thought content normal.         Judgment: Judgment normal.

## 2023-12-30 LAB
C GLABRATA DNA VAG QL NAA+PROBE: NEGATIVE
C KRUSEI DNA VAG QL NAA+PROBE: NEGATIVE
CANDIDA SP 6 PNL VAG NAA+PROBE: NEGATIVE
T VAGINALIS DNA VAG QL NAA+PROBE: NEGATIVE
VAGINOSIS/ITIS DNA PNL VAG PROBE+SIG AMP: NEGATIVE

## 2024-01-02 ENCOUNTER — TELEPHONE (OUTPATIENT)
Dept: OBGYN CLINIC | Facility: CLINIC | Age: 19
End: 2024-01-02

## 2024-01-02 NOTE — TELEPHONE ENCOUNTER
Left voice mail for patient to inform recent culture results negative per Dr. Melo.  My chart message also sent.

## 2024-01-02 NOTE — TELEPHONE ENCOUNTER
----- Message from Lincoln Melo MD sent at 1/2/2024 12:16 PM EST -----  Vaginal cultures were all benign    Thanks

## 2024-02-06 DIAGNOSIS — B37.9 YEAST INFECTION: Primary | ICD-10-CM

## 2024-02-06 RX ORDER — FLUCONAZOLE 150 MG/1
150 TABLET ORAL ONCE
Qty: 1 TABLET | Refills: 1 | Status: SHIPPED | OUTPATIENT
Start: 2024-02-06 | End: 2024-02-06

## 2024-02-13 NOTE — OP NOTE
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]     * 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]     * Malocclusion, Angle's class II [Z46 413]     * 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]     * Impacted teeth [K01 1]    Procedure(s) (LRB):  OSTEOTOMY MAXILLARY LEFORTE I (Bilateral)  EXTRACTION TEETH IMPACTED, 1, 16, 17 ,32 (N/A)  GENOPLASTY, BILATERAL SAGGITTAL SPLIT OSTEOTOMY (Bilateral)    Specimen(s):  * No specimens in log *    Estimated Blood Loss:   620 mL    Drains:  Urethral Catheter Non-latex; Temperature probe 16 Fr  (Active)   Number of days: 0       Anesthesia Type:   General    Operative Indications:  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]  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]  Malocclusion, Angle's class II [P29 162]  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]  Impacted teeth [K01 1]    Operative Findings:  Malocclusion  Impacted teeth #1, 16, 17, 32    Complications:   None    Procedure and Technique:  Patient was brought to the operating room and placed in supine position on the operating room table  After satisfactory anesthesia via naso-tracheal intubation, the naso-tracheal tube was secured to the septum with 0 Ethibond suture  The anesthesiologist placed the appropriate monitoring equipment to deliver hypotensive anesthesia  Intravenous prophylactic antibiotic was given  The patients eyes were protected with corneal shields  The patients head was placed in the Amador head ring in neural neck position   The head and neck were prepped in the usual sterile fashion  The mouth was suctioned  A moistened throat pack was placed  The mouth was cleansed with chlorhexidine 0 12% strength solution, and suctioned  Lidocaine 1% with 1:100,000 epinephrine was injected in vestibule of the maxilla and of the mandible  Attention was turned to the right lateral mandible  A vestibular incision was made adjacent to the second molar tooth and carried down to bone  Subperiosteal dissection exposed the region of the ramus and body for sagittal split osteotomies  Retractors were placed  The cortical cuts were marked out  The cortical cuts were completed with a reciprocating saw  The wound was irrigated and suctioned, retractors were removed, packs were placed  Attention was turned to the left mandible and exactly the same procedure was carried out without difficulty  Attention was turned to the maxilla and midface  The anterior vertical dimension was measured from the medial canthus to the mid-maxillary central incisor on each side and recorded  Attention was turned intraorally  A vestibular incision was made from zygomatic buttress to zygomatic buttress in the depth of the vestibule and carried down to bone  Subperiosteal dissection exposed the face of the anterior maxilla, the anterior nasal spine, floor of the nose, back to the pterygomaxillary suture region  Specialized retractors were placed  The maxilla was marked out for horizontal LeFort I osteotomy  With a reciprocating saw, osteotomies were completed through the lateral, anterior and medial maxillary sinus walls on each side  With the anterior nasal spine chisel, the septum was  from the maxilla  With the peterygomaxillary chisel, each pterygomaxillary suture was   With finger pressure, the maxilla was downfractured and disimpacted  Attention was directed to the floor of the maxillary sinus   The impacted tooth #16 was exposed with rotary instrumentation, sectioned and removed atraumatically with elevator and forceps  The exact same procedure was done to removed tooth #1  The prefabricated intermediate splint was brought to the table, secured to the maxillary dentition and then the jaws were wired together through the intermediary splint  The condyles were seated in the fossa  The desired horizontal and vertical dimensions were achieved for the maxilla  The maxilla was fixated in the desired position using Synthes implants: a titanium bone plate was adapted for each zygomatic buttress and each pyriform aperture and secured with titanium screws  The jaws were unwired  The occlusion was checked and found to be as planned for  The intermediate splint was removed  The final splint was brought to the table and wired to the maxillary dentition  Attention was turned to the chin region  The vestibular incision was made with a 15 blade in the depth if of the vestibule from canine to canine tooth and carried down to bone  Subperiosteal dissection exposed the face of the chin  Chin retractors were placed  Care was taken to avoid dissection at or above the mental nerve on each side  The chin was marked out for an oblique osteotomy below the roots of the teeth and below the mental nerve on each side  The osteotomy was completed with the oscillating and reciprocating saws  The chin was then advanced and vertically lengthened a small amount  Stabilization of the chin in its new position was achieved with titanium plates and screws  Attention was turned to the right lateral mandible  Packs were removed  Retractors were placed  With fine and large Obwegeser chisels, the sagittal split osteotomies were completed  The neurovascular bundle remained intact  Attention was turned to the contralateral side and the exact same procedure was carried out  The neurovascular bundle remained again intact  The distal mandible was then wired to the maxilla through the final splint  Attention was turned to the right lateral mandible  The wisdom tooth (#32) was identified and removed atraumatically with elevator and forceps  A small amount of bone was removed on the distal aspect of the proximal bone segment until there was good bone to bone approximation  Once this was achieved, the transbuccal trocar system was introduced  Three titanium screws were placed across the osteotomy site  Attention was turned to the left lateral mandible and the exact same procedure was carried out  Tooth #17 was removed in the same fashion  The jaws were unwired  The occlusion was checked and found to be as planned  The final splint was removed  All wound were irrigated and suctioned  The maxillary wound was closed with 3-0 Chromic gut continuous locking  The mandibular wound were closed with 3-0 Vicryl interrupted  The chin wound was closed in two layers with Vicryl 3-0 interrupted  The skin wounds were closed with 5-0 nylon  The mouth was irrigated and suctioned  The throat pack was removed  An orogastric tube was passed and removed  Inter occlusal elastics were applied to the maxillary and mandibular orthodontic appliances  The eye shield were removed and the eyes were rinsed with BSS solution  The suture from the septum of the nose was removed  The patient remained cardiovasculary stable throughout the procedure, was extubated in the OR and moved to recovery room          I was present for the entire procedure    Patient Disposition:  PACU     SIGNATURE: Choco De Leon DMD  DATE: September 8, 2020  TIME: 1:48 PM PAST MEDICAL HISTORY:  At risk for infection due to chemotherapy     Breast cancer     Carcinoma of breast treated with adjuvant chemotherapy     Essential hypertension

## 2024-02-23 ENCOUNTER — OFFICE VISIT (OUTPATIENT)
Dept: OBGYN CLINIC | Facility: CLINIC | Age: 19
End: 2024-02-23

## 2024-02-23 VITALS
WEIGHT: 123.4 LBS | SYSTOLIC BLOOD PRESSURE: 103 MMHG | BODY MASS INDEX: 23.32 KG/M2 | DIASTOLIC BLOOD PRESSURE: 55 MMHG | HEART RATE: 71 BPM

## 2024-02-23 DIAGNOSIS — Z30.09 GENERAL COUNSELING AND ADVICE ON FEMALE CONTRACEPTION: Primary | ICD-10-CM

## 2024-02-23 PROCEDURE — 99213 OFFICE O/P EST LOW 20 MIN: CPT | Performed by: NURSE PRACTITIONER

## 2024-02-23 NOTE — PATIENT INSTRUCTIONS
Call with decision for IUD insertion  Annual GYN exam is due after 8/18/294  Call with needds or concerns

## 2024-02-23 NOTE — PROGRESS NOTES
Assessment/Plan:         Diagnoses and all orders for this visit:    General counseling and advice on female contraception      Plan  Call with decision for IUD insertion  Annual GYN exam is due after 8/18/294  Call with needds or concerns  Pt verbalized understanding of all discussed.        Subjective:      Patient ID: Terese Mac is a 18 y.o. female.    HPI  Pt presents to discuss IUD insertion  Pt is considering a Kyleena IUD vs Mirena IUD  Pt has a new partner, unprotected intercourse x 1   Negative STD testing 7/2023    Safe and effective use of Mirena and Kyleena IUD discussed  Pt states she will consider both options and they schedule an insertion    Depression Screening Follow-up Plan: Patient's depression screening was negative with a PHQ-2 score of 1. Their PHQ-9 score was 7. Clinically patient does not have depression. No treatment is required.        The following portions of the patient's history were reviewed and updated as appropriate: allergies, current medications, past family history, past medical history, past social history, past surgical history, and problem list.    Review of Systems    .Pertinent items are note in the HPI      Objective:      There were no vitals taken for this visit.         Physical Exam  Vitals reviewed.   Eyes:      General:         Right eye: No discharge.         Left eye: No discharge.   Pulmonary:      Effort: Pulmonary effort is normal. No respiratory distress.   Musculoskeletal:         General: Normal range of motion.      Cervical back: Normal range of motion.   Neurological:      Mental Status: She is oriented to person, place, and time.   Psychiatric:         Mood and Affect: Mood normal.         Behavior: Behavior normal.       Negative cough or SOB

## 2024-03-02 ENCOUNTER — OFFICE VISIT (OUTPATIENT)
Dept: URGENT CARE | Age: 19
End: 2024-03-02
Payer: COMMERCIAL

## 2024-03-02 VITALS
DIASTOLIC BLOOD PRESSURE: 65 MMHG | RESPIRATION RATE: 18 BRPM | OXYGEN SATURATION: 99 % | BODY MASS INDEX: 23.56 KG/M2 | TEMPERATURE: 98.7 F | HEART RATE: 73 BPM | SYSTOLIC BLOOD PRESSURE: 110 MMHG | WEIGHT: 120 LBS | HEIGHT: 60 IN

## 2024-03-02 DIAGNOSIS — N39.0 URINARY TRACT INFECTION WITH HEMATURIA, SITE UNSPECIFIED: Primary | ICD-10-CM

## 2024-03-02 DIAGNOSIS — N89.8 VAGINAL DISCHARGE: ICD-10-CM

## 2024-03-02 DIAGNOSIS — R31.9 URINARY TRACT INFECTION WITH HEMATURIA, SITE UNSPECIFIED: Primary | ICD-10-CM

## 2024-03-02 DIAGNOSIS — Z71.1 CONCERN ABOUT STD IN FEMALE WITHOUT DIAGNOSIS: ICD-10-CM

## 2024-03-02 LAB
SL AMB  POCT GLUCOSE, UA: NEGATIVE
SL AMB LEUKOCYTE ESTERASE,UA: ABNORMAL
SL AMB POCT BILIRUBIN,UA: NEGATIVE
SL AMB POCT BLOOD,UA: ABNORMAL
SL AMB POCT CLARITY,UA: ABNORMAL
SL AMB POCT COLOR,UA: ABNORMAL
SL AMB POCT KETONES,UA: NEGATIVE
SL AMB POCT NITRITE,UA: NEGATIVE
SL AMB POCT PH,UA: 6.5
SL AMB POCT SPECIFIC GRAVITY,UA: 1.02
SL AMB POCT URINE HCG: NEGATIVE
SL AMB POCT URINE PROTEIN: NEGATIVE
SL AMB POCT UROBILINOGEN: 0.2

## 2024-03-02 PROCEDURE — 81025 URINE PREGNANCY TEST: CPT | Performed by: PHYSICIAN ASSISTANT

## 2024-03-02 PROCEDURE — 81002 URINALYSIS NONAUTO W/O SCOPE: CPT | Performed by: PHYSICIAN ASSISTANT

## 2024-03-02 PROCEDURE — 87591 N.GONORRHOEAE DNA AMP PROB: CPT | Performed by: PHYSICIAN ASSISTANT

## 2024-03-02 PROCEDURE — 87186 SC STD MICRODIL/AGAR DIL: CPT | Performed by: PHYSICIAN ASSISTANT

## 2024-03-02 PROCEDURE — 87077 CULTURE AEROBIC IDENTIFY: CPT | Performed by: PHYSICIAN ASSISTANT

## 2024-03-02 PROCEDURE — 99213 OFFICE O/P EST LOW 20 MIN: CPT | Performed by: EMERGENCY MEDICINE

## 2024-03-02 PROCEDURE — 87491 CHLMYD TRACH DNA AMP PROBE: CPT | Performed by: PHYSICIAN ASSISTANT

## 2024-03-02 PROCEDURE — 87086 URINE CULTURE/COLONY COUNT: CPT | Performed by: PHYSICIAN ASSISTANT

## 2024-03-02 RX ORDER — METRONIDAZOLE 500 MG/1
500 TABLET ORAL EVERY 12 HOURS SCHEDULED
Qty: 14 TABLET | Refills: 0 | Status: SHIPPED | OUTPATIENT
Start: 2024-03-02 | End: 2024-03-09

## 2024-03-02 RX ORDER — CEPHALEXIN 500 MG/1
500 CAPSULE ORAL EVERY 8 HOURS SCHEDULED
Qty: 30 CAPSULE | Refills: 0 | Status: SHIPPED | OUTPATIENT
Start: 2024-03-02 | End: 2024-03-12

## 2024-03-02 NOTE — PROGRESS NOTES
[facility]        NAME: Terese Mac is a 18 y.o. female  : 2005    MRN: [id number]  DATE: 2024  TIME: 4:08 PM    /65   Pulse 73   Temp 98.7 °F (37.1 °C)   Resp 18   Ht 5' (1.524 m)   Wt 54.4 kg (120 lb)   SpO2 99%   BMI 23.44 kg/m²     Assessment and Plan   Urinary tract infection with hematuria, site unspecified [N39.0, R31.9]  1. Urinary tract infection with hematuria, site unspecified  Urine culture    POCT urine dip    POCT urine HCG    Chlamydia/GC amplified DNA by PCR    cephalexin (KEFLEX) 500 mg capsule    metroNIDAZOLE (FLAGYL) 500 mg tablet      2. Vaginal discharge  cephalexin (KEFLEX) 500 mg capsule    metroNIDAZOLE (FLAGYL) 500 mg tablet      3. Concern about STD in female without diagnosis  cephalexin (KEFLEX) 500 mg capsule    metroNIDAZOLE (FLAGYL) 500 mg tablet            Patient Instructions       Follow up with PCP in 3-5 days.  Proceed to  ER if symptoms worsen.    Chief Complaint     Chief Complaint   Patient presents with    Possible UTI     Urinary frequency, irritation with urination, foul odor to urine, vaginal discharge, cloudy urine began yesterday         History of Present Illness       Pt with burning and urine urgency for several days, pt requesting std testing, pt with vaginal d/c  has hx of bacterial vaginosis         Review of Systems   Review of Systems   Constitutional: Negative.    HENT: Negative.     Eyes: Negative.    Respiratory: Negative.     Cardiovascular: Negative.    Endocrine: Negative.    Genitourinary:  Positive for dysuria, frequency and urgency.   Musculoskeletal: Negative.    Skin: Negative.    Allergic/Immunologic: Negative.    Neurological: Negative.    Hematological: Negative.    Psychiatric/Behavioral: Negative.     All other systems reviewed and are negative.        Current Medications       Current Outpatient Medications:     cephalexin (KEFLEX) 500 mg capsule, Take 1 capsule (500 mg total) by mouth every 8 (eight)  hours for 10 days, Disp: 30 capsule, Rfl: 0    metroNIDAZOLE (FLAGYL) 500 mg tablet, Take 1 tablet (500 mg total) by mouth every 12 (twelve) hours for 7 days, Disp: 14 tablet, Rfl: 0    terconazole (TERAZOL 7) 0.4 % vaginal cream, Insert 1 applicator into the vagina daily at bedtime (Patient not taking: Reported on 12/29/2023), Disp: 45 g, Rfl: 0    Current Allergies     Allergies as of 03/02/2024 - Reviewed 03/02/2024   Allergen Reaction Noted    Zithromax [azithromycin]  12/06/2016            The following portions of the patient's history were reviewed and updated as appropriate: allergies, current medications, past family history, past medical history, past social history, past surgical history and problem list.     Past Medical History:   Diagnosis Date    Anxiety     Bipolar disorder (HCC)     Congenital jaw deformity     surgically corrected 2020    Migraine     Pituitary tumor     Scoliosis        Past Surgical History:   Procedure Laterality Date    GENIOPLASTY Bilateral 9/8/2020    Procedure: GENOPLASTY, BILATERAL SAGGITTAL SPLIT OSTEOTOMY;  Surgeon: Choco De Leon DMD;  Location: [facility];  Service: Maxillofacial    MAXILLARY LE FORTE OSTEOTOMY Bilateral 9/8/2020    Procedure: OSTEOTOMY MAXILLARY LEFORTE I;  Surgeon: Choco De Leon DMD;  Location: [facility];  Service: Maxillofacial    MULTIPLE TOOTH EXTRACTIONS N/A 9/8/2020    Procedure: EXTRACTION TEETH IMPACTED, 1, 16, 17 ,32;  Surgeon: Choco De Leon DMD;  Location: [facility];  Service: Maxillofacial    TONSILLECTOMY  2009       Family History   Problem Relation Age of Onset    Breast cancer Mother     Seizures Mother     Migraines Mother     No Known Problems Father     No Known Problems Brother     No Known Problems Brother     Thyroid disease Maternal Grandmother     Diabetes Maternal Grandmother     Hypertension Maternal Grandmother     Thyroid disease Paternal Aunt     Diabetes Paternal Uncle     Colon cancer Neg Hx     Ovarian cancer  Neg Hx          Medications have been verified.        Objective   /65   Pulse 73   Temp 98.7 °F (37.1 °C)   Resp 18   Ht 5' (1.524 m)   Wt 54.4 kg (120 lb)   SpO2 99%   BMI 23.44 kg/m²        Physical Exam     Physical Exam  Vitals and nursing note reviewed.   Constitutional:       Appearance: Normal appearance. She is normal weight.      Comments: Discussed with pt will do std testing , pt will recheck with ob/gyn doctor for proper pelvic exam will give keflex for uti and flagyl for possible bactrial vaginosis    HENT:      Head: Normocephalic and atraumatic.      Right Ear: Tympanic membrane, ear canal and external ear normal.      Left Ear: Tympanic membrane, ear canal and external ear normal.      Nose: Nose normal.      Mouth/Throat:      Mouth: Mucous membranes are moist.      Pharynx: Oropharyngeal exudate present.   Eyes:      Extraocular Movements: Extraocular movements intact.      Conjunctiva/sclera: Conjunctivae normal.      Pupils: Pupils are equal, round, and reactive to light.   Cardiovascular:      Rate and Rhythm: Normal rate and regular rhythm.      Pulses: Normal pulses.      Heart sounds: Normal heart sounds.   Pulmonary:      Effort: Pulmonary effort is normal.      Breath sounds: Normal breath sounds.   Abdominal:      General: Abdomen is flat.      Palpations: Abdomen is soft.   Musculoskeletal:         General: Normal range of motion.      Cervical back: Normal range of motion and neck supple.   Skin:     General: Skin is warm.      Capillary Refill: Capillary refill takes less than 2 seconds.   Neurological:      Mental Status: She is alert and oriented to person, place, and time.

## 2024-03-03 LAB — BACTERIA UR CULT: ABNORMAL

## 2024-03-04 LAB
BACTERIA UR CULT: ABNORMAL
C TRACH DNA SPEC QL NAA+PROBE: NEGATIVE
N GONORRHOEA DNA SPEC QL NAA+PROBE: NEGATIVE

## 2024-03-05 DIAGNOSIS — B37.31 VAGINAL CANDIDIASIS: Primary | ICD-10-CM

## 2024-03-05 RX ORDER — FLUCONAZOLE 150 MG/1
150 TABLET ORAL
Qty: 2 TABLET | Refills: 0 | Status: SHIPPED | OUTPATIENT
Start: 2024-03-05 | End: 2024-03-09

## 2024-03-07 DIAGNOSIS — Z30.014 ENCOUNTER FOR INITIAL PRESCRIPTION OF INTRAUTERINE CONTRACEPTIVE DEVICE (IUD): Primary | ICD-10-CM

## 2024-03-07 RX ORDER — MISOPROSTOL 200 UG/1
TABLET ORAL
Qty: 2 TABLET | Refills: 0 | Status: SHIPPED | OUTPATIENT
Start: 2024-03-07

## 2024-03-13 ENCOUNTER — PROCEDURE VISIT (OUTPATIENT)
Dept: OBGYN CLINIC | Facility: CLINIC | Age: 19
End: 2024-03-13

## 2024-03-13 VITALS
SYSTOLIC BLOOD PRESSURE: 125 MMHG | WEIGHT: 126.8 LBS | BODY MASS INDEX: 24.76 KG/M2 | HEART RATE: 92 BPM | DIASTOLIC BLOOD PRESSURE: 76 MMHG

## 2024-03-13 DIAGNOSIS — Z30.430 ENCOUNTER FOR IUD INSERTION: Primary | ICD-10-CM

## 2024-03-13 LAB — SL AMB POCT URINE HCG: NEGATIVE

## 2024-03-13 PROCEDURE — 81025 URINE PREGNANCY TEST: CPT | Performed by: OBSTETRICS & GYNECOLOGY

## 2024-03-13 PROCEDURE — 58300 INSERT INTRAUTERINE DEVICE: CPT | Performed by: OBSTETRICS & GYNECOLOGY

## 2024-03-13 NOTE — PROGRESS NOTES
Iud insertions    Date/Time: 3/13/2024 1:00 PM    Performed by: Yardlie Toussaint-Foster, DO  Authorized by: Yardlie Toussaint-Foster, DO    Verbal consent obtained?: Yes    Written consent obtained?: Yes    Risks and benefits: Risks, benefits and alternatives were discussed    Consent given by:  Patient  Time Out:     Time out: Immediately prior to the procedure a time out was called      Time out performed at:  3/13/2024 1:14 PM  Patient states understanding of procedure being performed: Yes    Patient's understanding of procedure matches consent: Yes    Procedure consent matches procedure scheduled: Yes    Relevant documents present and verified: Yes    Patient identity confirmed:  Verbally with patient and provided demographic data  Procedure:     Negative urine pregnancy test: yes      Cervix cleaned and prepped: yes      Speculum placed in vagina: yes      Tenaculum applied to cervix: yes      Uterus sounded: yes      Uterus sound depth (cm):  6    IUD inserted with no complications: yes      IUD type:  Mirena    Strings trimmed: yes    Post-procedure:     Patient tolerated procedure well: yes      Patient will follow up after next period: yes    Comments:      Depression Screening Follow-up Plan: Patient's depression screening was positive with a PHQ-2 score of 2. Their PHQ-9 score was . Clinically patient does not have depression. No treatment is required.

## 2024-03-19 DIAGNOSIS — Z72.51 UNPROTECTED SEXUAL INTERCOURSE: ICD-10-CM

## 2024-03-19 DIAGNOSIS — Z72.51 UNPROTECTED SEXUAL INTERCOURSE: Primary | ICD-10-CM

## 2024-03-19 RX ORDER — LEVONORGESTREL 1.5 MG/1
1.5 TABLET ORAL ONCE
Qty: 1 TABLET | Refills: 0 | Status: SHIPPED | OUTPATIENT
Start: 2024-03-19 | End: 2024-03-19

## 2024-04-02 DIAGNOSIS — Z72.51 UNPROTECTED SEXUAL INTERCOURSE: ICD-10-CM

## 2024-04-02 RX ORDER — LEVONORGESTREL 1.5 MG/1
TABLET ORAL
Qty: 1 TABLET | Refills: 0 | Status: SHIPPED | OUTPATIENT
Start: 2024-04-02 | End: 2024-04-03

## 2024-04-03 RX ORDER — LEVONORGESTREL 1.5 MG/1
TABLET ORAL
Qty: 1 TABLET | Refills: 0 | Status: SHIPPED | OUTPATIENT
Start: 2024-04-03

## 2024-04-10 ENCOUNTER — OFFICE VISIT (OUTPATIENT)
Dept: OBGYN CLINIC | Facility: CLINIC | Age: 19
End: 2024-04-10

## 2024-04-10 VITALS
BODY MASS INDEX: 24.23 KG/M2 | HEART RATE: 85 BPM | SYSTOLIC BLOOD PRESSURE: 110 MMHG | WEIGHT: 123.4 LBS | HEIGHT: 60 IN | DIASTOLIC BLOOD PRESSURE: 72 MMHG

## 2024-04-10 DIAGNOSIS — Z30.014 ENCOUNTER FOR INITIAL PRESCRIPTION OF INTRAUTERINE CONTRACEPTIVE DEVICE (IUD): ICD-10-CM

## 2024-04-10 DIAGNOSIS — B37.31 VAGINAL CANDIDIASIS: Primary | ICD-10-CM

## 2024-04-10 PROCEDURE — 99213 OFFICE O/P EST LOW 20 MIN: CPT | Performed by: OBSTETRICS & GYNECOLOGY

## 2024-04-10 RX ORDER — FLUCONAZOLE 150 MG/1
150 TABLET ORAL ONCE
Qty: 1 TABLET | Refills: 0 | Status: SHIPPED | OUTPATIENT
Start: 2024-04-10 | End: 2024-04-10

## 2024-04-10 NOTE — PROGRESS NOTES
IUD STRING CHECK    Terese Mac is a 18 y.o. female who presents today for iud string check.  Patient reports it as follows:    Past Medical History:   Diagnosis Date   • Anxiety    • Bipolar disorder (HCC)    • Congenital jaw deformity     surgically corrected    • Migraine    • Pituitary tumor    • Scoliosis      Past Surgical History:   Procedure Laterality Date   • GENIOPLASTY Bilateral 2020    Procedure: GENOPLASTY, BILATERAL SAGGITTAL SPLIT OSTEOTOMY;  Surgeon: Choco De Leon DMD;  [facility];  Service: Maxillofacial   • MAXILLARY LE FORTE OSTEOTOMY Bilateral 2020    Procedure: OSTEOTOMY MAXILLARY LEFORTE I;  Surgeon: Choco De Leon DMD;  [facility];  Service: Maxillofacial   • MULTIPLE TOOTH EXTRACTIONS N/A 2020    Procedure: EXTRACTION TEETH IMPACTED, 1, 16, 17 ,32;  Surgeon: Choco De Leon DMD;  Location[facility];  Service: Maxillofacial   • TONSILLECTOMY       OB History          0    Para   0    Term   0       0    AB   0    Living   0         SAB   0    IAB   0    Ectopic   0    Multiple   0    Live Births   0               Social History     Tobacco Use   • Smoking status: Never   • Smokeless tobacco: Never   Vaping Use   • Vaping status: Never Used   Substance Use Topics   • Alcohol use: Never   • Drug use: Never     Social History     Substance and Sexual Activity   Sexual Activity Yes   • Partners: Male   • Birth control/protection: None       Current Outpatient Medications   Medication Instructions   • levonorgestrel (PLAN B ONE-STEP) tablet TAKE 1 TABLET BY MOUTH EVERY DAY AS ONE DOSE   • miSOPROStol (Cytotec) 200 mcg tablet Take one tablet orally the night prior to procedure then Take one tablet insert intravaginally the morning prior to procedure at 5:30am   • terconazole (TERAZOL 7) 0.4 % vaginal cream 1 applicator, Vaginal, Daily at bedtime       History of Present Illness:   Patient presents for iud string check with c/o  still having some bleeding.  Patient states had a uti was on Keflex afraid of getting a yeast infection.    Review of Systems:  Review of Systems   Genitourinary:  Positive for menstrual problem.        Breakthrough bleeding   All other systems reviewed and are negative.      Physical Exam:  /72 (BP Location: Left arm, Patient Position: Sitting, Cuff Size: Standard)   Pulse 85   Ht 5' (1.524 m)   Wt 56 kg (123 lb 6.4 oz)   LMP 03/02/2024   BMI 24.10 kg/m²   OBGyn Exam      Assessment:   1. IUD surveillance    Plan:   1. Return to office prn  2. Diflucan escribed.       Reviewed with patient that test results are available in [employer] immediately, but that they will not necessarily be reviewed by me immediately.  Explained that I will review results at my earliest opportunity and contact patient appropriately.

## 2024-04-17 ENCOUNTER — NURSE TRIAGE (OUTPATIENT)
Age: 19
End: 2024-04-17

## 2024-04-17 NOTE — TELEPHONE ENCOUNTER
"Patient is calling in, she started 4 days ago with burning with urination and foul smelling urine. She also notices a smell when she is cleaning herself. She would like to establish care with [facility]. Unable to provider an appt today or this week that works best for her. She is going to go to urgent care to be seen     Reason for Disposition   Bad or foul-smelling urine    Answer Assessment - Initial Assessment Questions  1. SYMPTOM: \"What's the main symptom you're concerned about?\" (e.g., frequency, incontinence)      Burning with urination   2. ONSET: \"When did the  burning  start?\"      4 days ago  3. PAIN: \"Is there any pain?\" If Yes, ask: \"How bad is it?\" (Scale: 1-10; mild, moderate, severe)      Denies pain, has burning sensation  4. CAUSE: \"What do you think is causing the symptoms?\"      Denies   5. OTHER SYMPTOMS: \"Do you have any other symptoms?\" (e.g., fever, flank pain, blood in urine, pain with urination)      Burning with urination, vaginal odor with urine and when cleaning    Protocols used: Urinary Symptoms-ADULT-[place]    "

## 2024-04-25 DIAGNOSIS — N76.0 BV (BACTERIAL VAGINOSIS): Primary | ICD-10-CM

## 2024-04-25 DIAGNOSIS — B96.89 BV (BACTERIAL VAGINOSIS): Primary | ICD-10-CM

## 2024-04-25 RX ORDER — METRONIDAZOLE 500 MG/1
500 TABLET ORAL EVERY 12 HOURS SCHEDULED
Qty: 14 TABLET | Refills: 0 | Status: SHIPPED | OUTPATIENT
Start: 2024-04-25 | End: 2024-05-02

## 2024-05-23 DIAGNOSIS — N89.8 VAGINAL DISCHARGE: Primary | ICD-10-CM

## 2024-05-23 RX ORDER — DOXYCYCLINE 100 MG/1
100 TABLET ORAL 2 TIMES DAILY
Qty: 20 TABLET | Refills: 0 | Status: SHIPPED | OUTPATIENT
Start: 2024-05-23 | End: 2024-06-02

## 2024-05-24 ENCOUNTER — APPOINTMENT (OUTPATIENT)
Dept: LAB | Facility: CLINIC | Age: 19
End: 2024-05-24
Payer: COMMERCIAL

## 2024-05-24 PROCEDURE — 87491 CHLMYD TRACH DNA AMP PROBE: CPT | Performed by: OBSTETRICS & GYNECOLOGY

## 2024-05-24 PROCEDURE — 87591 N.GONORRHOEAE DNA AMP PROB: CPT | Performed by: OBSTETRICS & GYNECOLOGY

## 2024-05-26 LAB
C TRACH DNA SPEC QL NAA+PROBE: NEGATIVE
N GONORRHOEA DNA SPEC QL NAA+PROBE: NEGATIVE

## 2024-06-14 ENCOUNTER — OFFICE VISIT (OUTPATIENT)
Dept: OBGYN CLINIC | Facility: CLINIC | Age: 19
End: 2024-06-14

## 2024-06-14 VITALS
DIASTOLIC BLOOD PRESSURE: 72 MMHG | BODY MASS INDEX: 23.87 KG/M2 | HEART RATE: 76 BPM | WEIGHT: 122.2 LBS | SYSTOLIC BLOOD PRESSURE: 109 MMHG

## 2024-06-14 DIAGNOSIS — N94.10 DYSPAREUNIA IN FEMALE: ICD-10-CM

## 2024-06-14 DIAGNOSIS — N93.9 ABNORMAL UTERINE BLEEDING (AUB): Primary | ICD-10-CM

## 2024-06-14 PROBLEM — E23.6 PITUITARY MASS (HCC): Status: RESOLVED | Noted: 2022-03-18 | Resolved: 2024-06-14

## 2024-06-14 PROBLEM — Z00.01 ENCOUNTER FOR WELL ADULT EXAM WITH ABNORMAL FINDINGS: Status: RESOLVED | Noted: 2023-07-17 | Resolved: 2024-06-14

## 2024-06-14 PROBLEM — J30.2 SEASONAL ALLERGIES: Status: RESOLVED | Noted: 2022-05-09 | Resolved: 2024-06-14

## 2024-06-14 PROBLEM — R11.11 VOMITING WITHOUT NAUSEA: Status: RESOLVED | Noted: 2022-03-18 | Resolved: 2024-06-14

## 2024-06-14 PROCEDURE — 99213 OFFICE O/P EST LOW 20 MIN: CPT | Performed by: NURSE PRACTITIONER

## 2024-06-14 NOTE — PATIENT INSTRUCTIONS
Thank you for your confidence in our team.   We appreciate you and welcome your feedback.   If you receive a survey from us, please take a few moments to let us know how we are doing.   Sincerely,  AMY Carter

## 2024-06-14 NOTE — PROGRESS NOTES
PROBLEM GYNECOLOGICAL VISIT    Terese Mac is a 18 y.o. female who presents today with complaint of AUB.  Her general medical history has been reviewed and she reports it as follows:    Past Medical History:   Diagnosis Date    Anxiety     Congenital jaw deformity     surgically corrected     Depression     Migraine     Pituitary tumor     Scoliosis     never req'd intervention     Past Surgical History:   Procedure Laterality Date    GENIOPLASTY Bilateral 2020    Procedure: GENOPLASTY, BILATERAL SAGGITTAL SPLIT OSTEOTOMY;  Surgeon: Choco De Leon DMD;  [facility];  Service: Maxillofacial    MAXILLARY LE FORTE OSTEOTOMY Bilateral 2020    Procedure: OSTEOTOMY MAXILLARY LEFORTE I;  Surgeon: Choco De Leon DMD;  [facility];  Service: Maxillofacial    MULTIPLE TOOTH EXTRACTIONS N/A 2020    Procedure: EXTRACTION TEETH IMPACTED, 1, 16, 17 ,32;  Surgeon: Choco De Leon DMD;  [facility];  Service: Maxillofacial    TONSILLECTOMY       OB History          0    Para   0    Term   0       0    AB   0    Living   0         SAB   0    IAB   0    Ectopic   0    Multiple   0    Live Births   0               Social History     Tobacco Use    Smoking status: Never    Smokeless tobacco: Never   Vaping Use    Vaping status: Never Used   Substance Use Topics    Alcohol use: Yes     Comment: couple times/month    Drug use: Never     Social History     Substance and Sexual Activity   Sexual Activity Yes    Partners: Male    Birth control/protection: I.U.D.       No current outpatient medications    History of Present Illness:   Terese Mac had Mirena IUD inserted 3/13/2024.  Since then she has been experiencing varying degrees of vaginal bleeding with bright red heavy bleeding to dark brown thick/clotty bleeding to more recently a thin yellowish and even a creamy pink/reddish bleeding.  Since the IUD insertion she has also intermittently experienced BV and  vaginal candidiasis which have been treated and resolved.  She now is intermittently wavering between thin yellowish to creamy pink/reddish vaginal bleeding/discharge and is unsure if this is normal or pathologic.  She also expresses that the last 2 times she has had sexual intercourse she has experienced a sharp pain with deep penetration and is concerned that her IUD may have moved.  She screened negative for GC/CT on 5/24/2024.    Review of Systems:  Review of Systems   Constitutional: Negative.    Gastrointestinal: Negative.    Genitourinary:  Positive for dyspareunia, vaginal bleeding and vaginal discharge.       Physical Exam:  /72 (BP Location: Right arm, Patient Position: Sitting, Cuff Size: Standard)   Pulse 76   Wt 55.4 kg (122 lb 3.2 oz)   LMP  (Exact Date) Comment: Mirena  BMI 23.87 kg/m²   Physical Exam  Constitutional:       General: She is not in acute distress.  Genitourinary:      Right Labia: No rash, tenderness, lesions or skin changes.     Left Labia: No tenderness, lesions, skin changes or rash.     Vaginal discharge (normal, physiologic) present.      No vaginal erythema or bleeding.      No cervical motion tenderness or lesion.      IUD strings visualized (protruding appropriately positioned from os).   Abdominal:      Palpations: Abdomen is soft.      Tenderness: There is no abdominal tenderness.   Neurological:      Mental Status: She is alert.   Skin:     General: Skin is warm and dry.   Vitals reviewed.       Point of Care Testing:   -Wet mount: no clue cells, no trichomonads, moderate WBC's, pH=4.0   -KOH mount: no hyphae   -Whiff: negative    Assessment:   1. AUB secondary to Mirena IUD.   2. Dyspareunia.    Plan:   1. Reassured patient that vaginal exam is normal and AUB is the most common side effect of Mirena IUD.  2. Imaging ordered: pelvic ultrasound.  Will call with results.   3. Return to office in 1-2 months for first annual GYN exam and pap smear.

## 2024-06-29 ENCOUNTER — HOSPITAL ENCOUNTER (OUTPATIENT)
Dept: RADIOLOGY | Facility: HOSPITAL | Age: 19
Discharge: HOME | End: 2024-06-29
Payer: COMMERCIAL

## 2024-06-29 DIAGNOSIS — N93.9 ABNORMAL UTERINE BLEEDING (AUB): ICD-10-CM

## 2024-06-29 PROCEDURE — 76856 US EXAM PELVIC COMPLETE: CPT

## 2024-06-29 PROCEDURE — 76830 TRANSVAGINAL US NON-OB: CPT

## 2024-07-26 DIAGNOSIS — U07.1 COVID-19: ICD-10-CM

## 2024-07-26 DIAGNOSIS — J00 ACUTE NASOPHARYNGITIS: Primary | ICD-10-CM

## 2024-07-26 RX ORDER — NIRMATRELVIR AND RITONAVIR 300-100 MG
3 KIT ORAL 2 TIMES DAILY
Qty: 30 TABLET | Refills: 0 | Status: SHIPPED | OUTPATIENT
Start: 2024-07-26 | End: 2024-07-31

## 2024-07-26 RX ORDER — AMOXICILLIN AND CLAVULANATE POTASSIUM 250; 125 MG/1; MG/1
1 TABLET, FILM COATED ORAL EVERY 12 HOURS SCHEDULED
Qty: 14 TABLET | Refills: 0 | Status: SHIPPED | OUTPATIENT
Start: 2024-07-26 | End: 2024-08-02

## 2024-07-30 ENCOUNTER — APPOINTMENT (OUTPATIENT)
Dept: LAB | Facility: HOSPITAL | Age: 19
End: 2024-07-30

## 2024-07-30 DIAGNOSIS — B96.89 BV (BACTERIAL VAGINOSIS): Primary | ICD-10-CM

## 2024-07-30 DIAGNOSIS — Z00.8 ENCOUNTER FOR OTHER GENERAL EXAMINATION: ICD-10-CM

## 2024-07-30 DIAGNOSIS — N76.0 BV (BACTERIAL VAGINOSIS): Primary | ICD-10-CM

## 2024-07-30 LAB
CHOLEST SERPL-MCNC: 116 MG/DL
EST. AVERAGE GLUCOSE BLD GHB EST-MCNC: 97 MG/DL
HBA1C MFR BLD: 5 %
HDLC SERPL-MCNC: 53 MG/DL
LDLC SERPL CALC-MCNC: 51 MG/DL (ref 0–100)
NONHDLC SERPL-MCNC: 63 MG/DL
TRIGL SERPL-MCNC: 62 MG/DL

## 2024-07-30 PROCEDURE — 83036 HEMOGLOBIN GLYCOSYLATED A1C: CPT

## 2024-07-30 PROCEDURE — 36415 COLL VENOUS BLD VENIPUNCTURE: CPT

## 2024-07-30 PROCEDURE — 80061 LIPID PANEL: CPT

## 2024-07-30 RX ORDER — METRONIDAZOLE 500 MG/1
500 TABLET ORAL EVERY 12 HOURS SCHEDULED
Qty: 14 TABLET | Refills: 0 | Status: SHIPPED | OUTPATIENT
Start: 2024-07-30 | End: 2024-08-06

## 2024-07-31 DIAGNOSIS — B37.31 VAGINAL CANDIDIASIS: Primary | ICD-10-CM

## 2024-07-31 RX ORDER — FLUCONAZOLE 150 MG/1
150 TABLET ORAL ONCE
Qty: 1 TABLET | Refills: 0 | Status: SHIPPED | OUTPATIENT
Start: 2024-07-31 | End: 2024-07-31

## 2024-09-03 ENCOUNTER — TELEPHONE (OUTPATIENT)
Dept: FAMILY MEDICINE CLINIC | Facility: CLINIC | Age: 19
End: 2024-09-03

## 2024-09-05 ENCOUNTER — APPOINTMENT (OUTPATIENT)
Dept: LAB | Facility: CLINIC | Age: 19
End: 2024-09-05

## 2024-09-05 DIAGNOSIS — R39.9 UTI SYMPTOMS: ICD-10-CM

## 2024-09-05 DIAGNOSIS — R39.9 UTI SYMPTOMS: Primary | ICD-10-CM

## 2024-09-05 LAB
BACTERIA UR QL AUTO: ABNORMAL /HPF
BILIRUB UR QL STRIP: NEGATIVE
CLARITY UR: ABNORMAL
COLOR UR: COLORLESS
GLUCOSE UR STRIP-MCNC: NEGATIVE MG/DL
HGB UR QL STRIP.AUTO: ABNORMAL
KETONES UR STRIP-MCNC: NEGATIVE MG/DL
LEUKOCYTE ESTERASE UR QL STRIP: ABNORMAL
NITRITE UR QL STRIP: NEGATIVE
NON-SQ EPI CELLS URNS QL MICRO: ABNORMAL /HPF
PH UR STRIP.AUTO: 6.5 [PH]
PROT UR STRIP-MCNC: NEGATIVE MG/DL
RBC #/AREA URNS AUTO: ABNORMAL /HPF
SP GR UR STRIP.AUTO: 1 (ref 1–1.03)
UROBILINOGEN UR STRIP-ACNC: <2 MG/DL
WBC #/AREA URNS AUTO: ABNORMAL /HPF

## 2024-09-05 PROCEDURE — 87086 URINE CULTURE/COLONY COUNT: CPT | Performed by: NURSE PRACTITIONER

## 2024-09-05 PROCEDURE — 81001 URINALYSIS AUTO W/SCOPE: CPT

## 2024-09-06 LAB — BACTERIA UR CULT: NORMAL

## 2024-09-11 ENCOUNTER — OFFICE VISIT (OUTPATIENT)
Dept: OBGYN CLINIC | Facility: CLINIC | Age: 19
End: 2024-09-11

## 2024-09-11 VITALS
HEART RATE: 73 BPM | DIASTOLIC BLOOD PRESSURE: 72 MMHG | WEIGHT: 123 LBS | HEIGHT: 60 IN | BODY MASS INDEX: 24.15 KG/M2 | SYSTOLIC BLOOD PRESSURE: 109 MMHG

## 2024-09-11 DIAGNOSIS — R10.2 SUPRAPUBIC DISCOMFORT: ICD-10-CM

## 2024-09-11 DIAGNOSIS — B37.31 VAGINAL CANDIDIASIS: ICD-10-CM

## 2024-09-11 DIAGNOSIS — N89.8 VAGINAL DISCHARGE: ICD-10-CM

## 2024-09-11 DIAGNOSIS — N89.8 VAGINA ITCHING: Primary | ICD-10-CM

## 2024-09-11 DIAGNOSIS — N89.8 VAGINAL ODOR: ICD-10-CM

## 2024-09-11 DIAGNOSIS — N93.9 ABNORMAL UTERINE BLEEDING (AUB): ICD-10-CM

## 2024-09-11 LAB
BV WHIFF TEST VAG QL: ABNORMAL
CLUE CELLS SPEC QL WET PREP: ABNORMAL
PH SMN: 3.5 [PH]
SL AMB  POCT GLUCOSE, UA: NORMAL
SL AMB LEUKOCYTE ESTERASE,UA: NORMAL
SL AMB POCT BILIRUBIN,UA: NORMAL
SL AMB POCT BLOOD,UA: NORMAL
SL AMB POCT CLARITY,UA: CLEAR
SL AMB POCT COLOR,UA: YELLOW
SL AMB POCT KETONES,UA: NORMAL
SL AMB POCT NITRITE,UA: NORMAL
SL AMB POCT PH,UA: 7
SL AMB POCT SPECIFIC GRAVITY,UA: 1.01
SL AMB POCT URINE PROTEIN: NORMAL
SL AMB POCT UROBILINOGEN: NORMAL
SL AMB POCT WET MOUNT: ABNORMAL
T VAGINALIS VAG QL WET PREP: ABNORMAL
YEAST VAG QL WET PREP: ABNORMAL

## 2024-09-11 PROCEDURE — 87491 CHLMYD TRACH DNA AMP PROBE: CPT | Performed by: NURSE PRACTITIONER

## 2024-09-11 PROCEDURE — 81003 URINALYSIS AUTO W/O SCOPE: CPT | Performed by: NURSE PRACTITIONER

## 2024-09-11 PROCEDURE — 99213 OFFICE O/P EST LOW 20 MIN: CPT | Performed by: NURSE PRACTITIONER

## 2024-09-11 PROCEDURE — 87591 N.GONORRHOEAE DNA AMP PROB: CPT | Performed by: NURSE PRACTITIONER

## 2024-09-11 PROCEDURE — 87210 SMEAR WET MOUNT SALINE/INK: CPT | Performed by: NURSE PRACTITIONER

## 2024-09-11 RX ORDER — NORETHINDRONE ACETATE AND ETHINYL ESTRADIOL 1MG-20(21)
1 KIT ORAL DAILY
Qty: 28 TABLET | Refills: 2 | Status: SHIPPED | OUTPATIENT
Start: 2024-09-11

## 2024-09-11 RX ORDER — FLUCONAZOLE 150 MG/1
150 TABLET ORAL ONCE
Qty: 1 TABLET | Refills: 0 | Status: SHIPPED | OUTPATIENT
Start: 2024-09-11 | End: 2024-09-11

## 2024-09-11 NOTE — PROGRESS NOTES
PROBLEM GYNECOLOGICAL VISIT    Terese Mac is a 19 y.o. female who presents today with complaint of vaginal itching and AUB.  Her general medical history has been reviewed and she reports it as follows:    Past Medical History:   Diagnosis Date    Anxiety     Congenital jaw deformity     surgically corrected     Depression     Migraine     Pituitary tumor     Scoliosis     never req'd intervention     Past Surgical History:   Procedure Laterality Date    GENIOPLASTY Bilateral 2020    Procedure: GENOPLASTY, BILATERAL SAGGITTAL SPLIT OSTEOTOMY;  Surgeon: Choco De Leon DMD;  [facility];  Service: Maxillofacial    MAXILLARY LE FORTE OSTEOTOMY Bilateral 2020    Procedure: OSTEOTOMY MAXILLARY LEFORTE I;  Surgeon: Choco De Leon DMD;  [facility];  Service: Maxillofacial    MULTIPLE TOOTH EXTRACTIONS N/A 2020    Procedure: EXTRACTION TEETH IMPACTED, 1, 16, 17 ,32;  Surgeon: Choco De Leon DMD;  [facility];  Service: Maxillofacial    TONSILLECTOMY       OB History          0    Para   0    Term   0       0    AB   0    Living   0         SAB   0    IAB   0    Ectopic   0    Multiple   0    Live Births   0               Social History     Tobacco Use    Smoking status: Never    Smokeless tobacco: Never   Vaping Use    Vaping status: Never Used   Substance Use Topics    Alcohol use: Yes     Comment: couple times/month    Drug use: Never     Social History     Substance and Sexual Activity   Sexual Activity Yes    Partners: Male    Birth control/protection: I.U.D.       No current outpatient medications    History of Present Illness:   Reports for the past week she has been experiencing vaginal itching with some associated white vaginal discharge and vague odor.  She is also still experiencing AUB from her Mirena IUD and over the past month has had vaginal bleeding (heavier than usual) more than 50% of days.  She is frustrated as the IUD has been in  place now since 3/2024.  She does experience painful uterine cramping associated with the vaginal bleeding.  Additionally, she reports that for the past 1-2 weeks she has been experiencing suprapubic discomfort/pressure after urinating.  Denies dysuria or other urinary symptoms.  Urine culture performed 9/5/2024 was negative.    Review of Systems:  Review of Systems   Constitutional: Negative.    Gastrointestinal: Negative.    Genitourinary:  Positive for vaginal bleeding (AUB secondary to Mirena IUD), vaginal discharge (and vague odor) and vaginal pain (itching). Negative for dysuria, frequency, pelvic pain and urgency.       Physical Exam:  /72 (BP Location: Left arm, Patient Position: Sitting, Cuff Size: Standard)   Pulse 73   Ht 5' (1.524 m)   Wt 55.8 kg (123 lb)   BMI 24.02 kg/m²   Physical Exam  Constitutional:       General: She is not in acute distress.  Genitourinary:      Vulva exam comments: Normal.      No vaginal discharge or erythema.   Abdominal:      Palpations: Abdomen is soft.      Tenderness: There is no abdominal tenderness.   Neurological:      Mental Status: She is alert.   Skin:     General: Skin is warm and dry.   Vitals reviewed.       Point of Care Testing:   -Wet mount: no clue cells, no trichomonads, few WBC's, pH=3.5   -KOH mount: + spores   -Whiff: negative   -urine dipstick: neg leuks, neg nitrites, neg blood    Assessment:   1. Vaginal candidiasis.    Plan:   1. Cultures ordered: GC/CT.   2. Given Rx Diflucan.   3. Offered OCP trial for 3 months and she agrees. Rx sent to pharmacy and instructed on appropriate administration.   4. Return to office next week as already scheduled for annual GYN exam and first pap smear.    Reviewed with patient that test results are available in [employer] immediately, but that they will not necessarily be reviewed by me immediately.  Explained that I will review results at my earliest opportunity and contact patient appropriately.

## 2024-09-13 LAB
C TRACH DNA SPEC QL NAA+PROBE: NEGATIVE
N GONORRHOEA DNA SPEC QL NAA+PROBE: NEGATIVE

## 2024-09-18 ENCOUNTER — ANNUAL EXAM (OUTPATIENT)
Dept: OBGYN CLINIC | Facility: CLINIC | Age: 19
End: 2024-09-18

## 2024-09-18 VITALS
BODY MASS INDEX: 24.26 KG/M2 | HEIGHT: 60 IN | SYSTOLIC BLOOD PRESSURE: 148 MMHG | DIASTOLIC BLOOD PRESSURE: 81 MMHG | WEIGHT: 123.6 LBS | HEART RATE: 81 BPM

## 2024-09-18 DIAGNOSIS — Z12.39 ENCOUNTER FOR BREAST CANCER SCREENING USING NON-MAMMOGRAM MODALITY: ICD-10-CM

## 2024-09-18 DIAGNOSIS — Z01.419 ENCOUNTER FOR GYNECOLOGICAL EXAMINATION WITHOUT ABNORMAL FINDING: Primary | ICD-10-CM

## 2024-09-18 DIAGNOSIS — Z80.3 FAMILY HISTORY OF BREAST CANCER IN FIRST DEGREE RELATIVE: ICD-10-CM

## 2024-09-18 DIAGNOSIS — Z12.4 SCREENING FOR CERVICAL CANCER: ICD-10-CM

## 2024-09-18 PROCEDURE — S0612 ANNUAL GYNECOLOGICAL EXAMINA: HCPCS | Performed by: NURSE PRACTITIONER

## 2024-09-18 PROCEDURE — G0145 SCR C/V CYTO,THINLAYER,RESCR: HCPCS | Performed by: NURSE PRACTITIONER

## 2024-09-18 NOTE — PROGRESS NOTES
ANNUAL GYNECOLOGICAL EXAMINATION    Terese Mac is a 19 y.o. female who presents today for annual GYN exam.  She has never had a pap smear previously.  She had HIV screening performed 2023 and it was negative.  She reports menses as irregular bleeding due to Mirena IUD.  Her general medical history has been reviewed and she reports it as follows:    Past Medical History:   Diagnosis Date    Anxiety     Congenital jaw deformity     surgically corrected     Depression     Migraine     Pituitary tumor     Scoliosis     never req'd intervention     Past Surgical History:   Procedure Laterality Date    GENIOPLASTY Bilateral 2020    Procedure: GENOPLASTY, BILATERAL SAGGITTAL SPLIT OSTEOTOMY;  Surgeon: Choco De Leon DMD;  [facility];  Service: Maxillofacial    MAXILLARY LE FORTE OSTEOTOMY Bilateral 2020    Procedure: OSTEOTOMY MAXILLARY LEFORTE I;  Surgeon: Choco De Leon DMD;  [facility];  Service: Maxillofacial    MULTIPLE TOOTH EXTRACTIONS N/A 2020    Procedure: EXTRACTION TEETH IMPACTED, 1, 16, 17 ,32;  Surgeon: Choco De Leon DMD;  [facility];  Service: Maxillofacial    TONSILLECTOMY       OB History          0    Para   0    Term   0       0    AB   0    Living   0         SAB   0    IAB   0    Ectopic   0    Multiple   0    Live Births   0               Social History     Tobacco Use    Smoking status: Never    Smokeless tobacco: Never   Vaping Use    Vaping status: Never Used   Substance Use Topics    Alcohol use: Yes     Comment: couple times/month    Drug use: Never     Social History     Substance and Sexual Activity   Sexual Activity Yes    Partners: Male    Birth control/protection: I.U.D.     Cancer-related family history includes Breast cancer (age of onset: 27) in her mother. There is no history of Colon cancer or Ovarian cancer.    Current Outpatient Medications   Medication Instructions    Levonorgestrel (MIRENA) 20 MCG/DAY  IUD 1 each, Intrauterine Device, Once every 8 years - IUD    norethindrone-ethinyl estradiol (JUNEL FE 1/20) 1-20 MG-MCG per tablet 1 tablet, Oral, Daily       Review of Systems:  Review of Systems   Constitutional: Negative.    Gastrointestinal: Negative.    Genitourinary:  Positive for menstrual problem. Negative for difficulty urinating, pelvic pain (AUB due to Mirena IUD) and vaginal discharge.   Skin: Negative.        Physical Exam:  /81 (BP Location: Left arm, Patient Position: Sitting, Cuff Size: Standard)   Pulse 81   Ht 5' (1.524 m)   Wt 56.1 kg (123 lb 9.6 oz)   BMI 24.14 kg/m²   Physical Exam  Constitutional:       General: She is not in acute distress.     Appearance: She is well-developed.   Genitourinary:      Vulva normal.      No lesions in the vagina.        Right Adnexa: not tender and no mass present.     Left Adnexa: not tender and no mass present.     No cervical motion tenderness or lesion.      IUD strings visualized (appropriately positioned in os).      Uterus is not tender.   Breasts:     Right: No mass, nipple discharge, skin change or tenderness.      Left: No mass, nipple discharge, skin change or tenderness.   Neck:      Thyroid: No thyromegaly.   Cardiovascular:      Rate and Rhythm: Normal rate and regular rhythm.   Pulmonary:      Effort: Pulmonary effort is normal.   Abdominal:      Palpations: Abdomen is soft.      Tenderness: There is no abdominal tenderness.   Musculoskeletal:      Cervical back: Neck supple.   Neurological:      Mental Status: She is alert and oriented to person, place, and time.   Skin:     General: Skin is warm and dry.   Vitals reviewed.     Assessment/Plan:   1. Normal well-woman GYN exam.  2. Cervical cancer screening:  Normal cervical exam.  Pap smear done with HPV reflex.  Has received full HPV vaccine series in the past.   3. STD screening:  Orders placed for serum anti-HIV, anti-HCV, HbsAg, syphilis panel.  Screened negative for GC/CT on  9/11/2024   4. Breast cancer screening:  Normal breast exam.  Patient's mother with breast CA at age 27.  Referral ordered for consultation with [facility].  Reviewed breast self-awareness.   5. Depression Screening: Patient's depression screening was assessed with a PHQ-2 score of 2. Their PHQ-9 score was 5. Clinically patient does not have depression. No treatment is required.    6. BMI Counseling: Body mass index is 24.14 kg/m².  No intervention indicated.   7. Contraception:  Mirena IUD.   8. Return to office in 1 year for annual GYN exam.    Reviewed with patient that test results are [facility] immediately, but that they will not necessarily be reviewed by me immediately.  Explained that I will review results at my earliest opportunity and contact patient appropriately.

## 2024-09-23 LAB
LAB AP GYN PRIMARY INTERPRETATION: NORMAL
Lab: NORMAL

## 2024-10-07 ENCOUNTER — TELEPHONE (OUTPATIENT)
Dept: FAMILY MEDICINE CLINIC | Facility: CLINIC | Age: 19
End: 2024-10-07

## 2024-10-16 ENCOUNTER — TELEPHONE (OUTPATIENT)
Dept: FAMILY MEDICINE CLINIC | Facility: CLINIC | Age: 19
End: 2024-10-16

## 2024-10-16 NOTE — TELEPHONE ENCOUNTER
Called and left message for patient to return call to the office with insurance information for appointment scheduled on 10/18/24 with Dr. Ashby

## 2024-10-18 ENCOUNTER — OFFICE VISIT (OUTPATIENT)
Dept: FAMILY MEDICINE CLINIC | Facility: CLINIC | Age: 19
End: 2024-10-18
Payer: COMMERCIAL

## 2024-10-18 VITALS
SYSTOLIC BLOOD PRESSURE: 110 MMHG | HEIGHT: 62 IN | BODY MASS INDEX: 22.82 KG/M2 | TEMPERATURE: 98.7 F | OXYGEN SATURATION: 98 % | DIASTOLIC BLOOD PRESSURE: 60 MMHG | HEART RATE: 71 BPM | WEIGHT: 124 LBS

## 2024-10-18 DIAGNOSIS — E23.6 RATHKE'S CLEFT CYST (HCC): ICD-10-CM

## 2024-10-18 DIAGNOSIS — R79.89 ELEVATED PROLACTIN LEVEL: ICD-10-CM

## 2024-10-18 DIAGNOSIS — Z00.01 ENCOUNTER FOR ROUTINE ADULT PHYSICAL EXAM WITH ABNORMAL FINDINGS: Primary | ICD-10-CM

## 2024-10-18 PROCEDURE — 99214 OFFICE O/P EST MOD 30 MIN: CPT | Performed by: FAMILY MEDICINE

## 2024-10-18 PROCEDURE — 99395 PREV VISIT EST AGE 18-39: CPT | Performed by: FAMILY MEDICINE

## 2024-10-18 NOTE — PROGRESS NOTES
Adult Annual Physical  Name: Terese Mac      : 2005      MRN: [id number]  Encounter Provider: Bharati Ashby MD  Encounter Date: 10/18/2024   Encounter department: [facility]    Assessment & Plan  Encounter for routine adult physical exam with abnormal findings  Advice and education were given regarding nutrition, aerobic exercises, weight-bearing exercises, cardiovascular risk reduction, fall risk reduction, and age-appropriate supplements.     The patient was counseled regarding instructions for management, risk factor reductions, prognosis, risks and benefits of treatment options, patient and family education, and importance of compliance with treatment.       Rathke's cleft cyst (HCC)  Patient already evaluated by neurosurgeon previously who recommend to be evaluated by endocrinology discussed above with the patient placed referral well  Orders:    Ambulatory Referral to Endocrinology; Future    Elevated prolactin level  Finding on blood work recommend to be evaluated by endocrinology       Immunizations and preventive care screenings were discussed with patient today. Appropriate education was printed on patient's after visit summary.    Counseling:  Alcohol/drug use: discussed moderation in alcohol intake, the recommendations for healthy alcohol use, and avoidance of illicit drug use.  Dental Health: discussed importance of regular tooth brushing, flossing, and dental visits.  Injury prevention: discussed safety/seat belts, safety helmets, smoke detectors, carbon monoxide detectors, and smoking near bedding or upholstery.  Sexual health: discussed sexually transmitted diseases, partner selection, use of condoms, avoidance of unintended pregnancy, and contraceptive alternatives.  Exercise: the importance of regular exercise/physical activity was discussed. Recommend exercise 3-5 times per week for at least 30 minutes.          History of Present Illness  "patient here for well exam and follow-up with a chronic condition patient previously she had abnormal MRI of the brain she had a cleft cyst refer to neurosurgeon who recommend to do hormonal evaluation by follow-up with endocrinology patient did not see them yet she is asymptomatic previous blood work shows slight increase in her prolactin level no nipple discharge and no pain no headache recent blood work discussed the patient neurosurgeon note discussed with the patient past medical surgical family and social history review    Adult Annual Physical:  Patient presents for annual physical.     Diet and Physical Activity:  - Diet/Nutrition:. no special diet  - Exercise: no formal exercise.    Depression Screening:  - PHQ-2 Score: 0    General Health:  - Sleep: 4-6 hours of sleep on average.  - Hearing: normal hearing bilateral ears.  - Vision: no vision problems.    /GYN Health:  - Follows with GYN: yes.   - Menopause: premenopausal.   - Contraception: IUD placement.      Review of Systems   Constitutional:  Negative for chills and fever.   HENT:  Negative for ear pain and sore throat.    Eyes:  Negative for pain and visual disturbance.   Respiratory:  Negative for cough and shortness of breath.    Cardiovascular:  Negative for chest pain and palpitations.   Gastrointestinal:  Negative for abdominal pain, constipation, diarrhea and vomiting.   Genitourinary:  Negative for dysuria and hematuria.   Musculoskeletal:  Negative for arthralgias and back pain.   Skin:  Negative for color change and rash.   Neurological:  Negative for seizures and syncope.   Hematological:  Does not bruise/bleed easily.   Psychiatric/Behavioral:  Negative for behavioral problems.    All other systems reviewed and are negative.        Objective     /60 (BP Location: Left arm, Patient Position: Sitting)   Pulse 71   Temp 98.7 °F (37.1 °C) (Tympanic)   Ht 5' 1.5\" (1.562 m)   Wt 56.2 kg (124 lb)   SpO2 98%   Breastfeeding No   BMI " 23.05 kg/m²     Physical Exam  Vitals and nursing note reviewed.   Constitutional:       General: She is not in acute distress.     Appearance: She is well-developed. She is not diaphoretic.   HENT:      Head: Normocephalic.      Right Ear: Tympanic membrane and external ear normal.      Left Ear: Tympanic membrane and external ear normal.      Nose: No rhinorrhea.      Mouth/Throat:      Pharynx: No posterior oropharyngeal erythema.   Eyes:      General:         Right eye: No discharge.         Left eye: No discharge.      Conjunctiva/sclera: Conjunctivae normal.   Neck:      Vascular: No JVD.   Cardiovascular:      Rate and Rhythm: Normal rate and regular rhythm.      Heart sounds: Normal heart sounds. No murmur heard.     No gallop.   Pulmonary:      Effort: Pulmonary effort is normal. No respiratory distress.      Breath sounds: Normal breath sounds. No wheezing.   Abdominal:      General: There is no distension.      Palpations: Abdomen is soft.      Tenderness: There is no abdominal tenderness. There is no rebound.   Musculoskeletal:         General: No tenderness.      Cervical back: Normal range of motion and neck supple.   Lymphadenopathy:      Cervical: No cervical adenopathy.   Skin:     General: Skin is warm.      Findings: No rash.   Neurological:      Mental Status: She is alert and oriented to person, place, and time.

## 2024-10-19 PROBLEM — Z00.01 ENCOUNTER FOR ROUTINE ADULT PHYSICAL EXAM WITH ABNORMAL FINDINGS: Status: ACTIVE | Noted: 2024-10-19

## 2024-10-19 NOTE — ASSESSMENT & PLAN NOTE
Advice and education were given regarding nutrition, aerobic exercises, weight-bearing exercises, cardiovascular risk reduction, fall risk reduction, and age-appropriate supplements.     The patient was counseled regarding instructions for management, risk factor reductions, prognosis, risks and benefits of treatment options, patient and family education, and importance of compliance with treatment.

## 2024-10-19 NOTE — ASSESSMENT & PLAN NOTE
Patient already evaluated by neurosurgeon previously who recommend to be evaluated by endocrinology discussed above with the patient placed referral well  Orders:    Ambulatory Referral to Endocrinology; Future

## 2024-10-28 ENCOUNTER — OFFICE VISIT (OUTPATIENT)
Dept: OBGYN CLINIC | Facility: CLINIC | Age: 19
End: 2024-10-28

## 2024-10-28 VITALS
HEART RATE: 76 BPM | HEIGHT: 62 IN | DIASTOLIC BLOOD PRESSURE: 75 MMHG | WEIGHT: 122.8 LBS | SYSTOLIC BLOOD PRESSURE: 111 MMHG | BODY MASS INDEX: 22.6 KG/M2

## 2024-10-28 DIAGNOSIS — R39.9 UTI SYMPTOMS: Primary | ICD-10-CM

## 2024-10-28 DIAGNOSIS — B37.9 YEAST INFECTION: ICD-10-CM

## 2024-10-28 DIAGNOSIS — B96.89 BACTERIAL VAGINOSIS: ICD-10-CM

## 2024-10-28 DIAGNOSIS — N76.0 BACTERIAL VAGINOSIS: ICD-10-CM

## 2024-10-28 LAB
BV WHIFF TEST VAG QL: POSITIVE
CLUE CELLS SPEC QL WET PREP: POSITIVE
PH SMN: 5.5 [PH]
SL AMB POCT WET MOUNT: ABNORMAL
T VAGINALIS VAG QL WET PREP: NEGATIVE
YEAST VAG QL WET PREP: ABNORMAL

## 2024-10-28 PROCEDURE — 87077 CULTURE AEROBIC IDENTIFY: CPT | Performed by: NURSE PRACTITIONER

## 2024-10-28 PROCEDURE — 87210 SMEAR WET MOUNT SALINE/INK: CPT | Performed by: NURSE PRACTITIONER

## 2024-10-28 PROCEDURE — 87086 URINE CULTURE/COLONY COUNT: CPT | Performed by: NURSE PRACTITIONER

## 2024-10-28 PROCEDURE — 87186 SC STD MICRODIL/AGAR DIL: CPT | Performed by: NURSE PRACTITIONER

## 2024-10-28 PROCEDURE — 99213 OFFICE O/P EST LOW 20 MIN: CPT | Performed by: NURSE PRACTITIONER

## 2024-10-28 RX ORDER — METRONIDAZOLE 500 MG/1
500 TABLET ORAL EVERY 12 HOURS SCHEDULED
Qty: 14 TABLET | Refills: 0 | Status: SHIPPED | OUTPATIENT
Start: 2024-10-28 | End: 2024-11-04

## 2024-10-28 RX ORDER — FLUCONAZOLE 150 MG/1
150 TABLET ORAL ONCE
Qty: 1 TABLET | Refills: 1 | Status: SHIPPED | OUTPATIENT
Start: 2024-10-28 | End: 2024-10-28

## 2024-10-28 RX ORDER — NITROFURANTOIN 25; 75 MG/1; MG/1
100 CAPSULE ORAL 2 TIMES DAILY
Qty: 6 CAPSULE | Refills: 0 | Status: SHIPPED | OUTPATIENT
Start: 2024-10-28 | End: 2024-10-31

## 2024-10-28 NOTE — PROGRESS NOTES
"Assessment/Plan:     Diagnoses and all orders for this visit:    UTI symptoms  -     Urine culture; Future  -     Urine culture  -     nitrofurantoin (MACROBID) 100 mg capsule; Take 1 capsule (100 mg total) by mouth 2 (two) times a day for 3 days    Bacterial vaginosis  -     POCT wet mount  -     metroNIDAZOLE (FLAGYL) 500 mg tablet; Take 1 tablet (500 mg total) by mouth every 12 (twelve) hours for 7 days    Yeast infection  -     POCT wet mount  -     fluconazole (DIFLUCAN) 150 mg tablet; Take 1 tablet (150 mg total) by mouth once for 1 dose      Plan  Take Fluconazole as directed  Wear loose clothes and cotton understand  Take Metronidazole as directed  Take Macrobid as directed  Increase water intake  Annual GYN exam is due afer 9/18/2025  Pt verbalized understanding of all discussed.      Subjective:      Patient ID: Terese Mac is a 19 y.o. female.    HPI  Pt presents with C/O pain with urination and vaginal discharge with an odor  HPV vaccines 2013 and 2014  WNL PAP 9/18/2024    Explained wet mount was positive for yeast and BV, safe and effective use of Fluconazole and Metronidazole was provided, discussed comfort measures    Explained Macrobid would be ordered based on symptions since urine dip is not possible since AZO was taken which changes urine color. If urine is positive and another antibiotic is needed you will be called. Safe and effective use of Macrobid was ordered.  Pt was advised to increase water intake      The following portions of the patient's history were reviewed and updated as appropriate: allergies, current medications, past family history, past medical history, past social history, past surgical history and problem list.    Review of Systems    .Pertinent items are note in the HPI      Objective:      /75 (BP Location: Right arm, Patient Position: Sitting, Cuff Size: Standard)   Pulse 76   Ht 5' 1.5\" (1.562 m)   Wt 55.7 kg (122 lb 12.8 oz)   LMP  (LMP Unknown)   BMI " 22.83 kg/m²          Physical Exam    Alert and oriented  WNL respiratory effort, negative cough or SOB  Vulva negative lesions or erythema  Vagina positive thin gray/white discharge, thick white dischage was noted on walls of the vagina  Cervix negative lseions, positive thin gray discharge, IUD string was noted at cervical os  Wet mount was positive for BV and yeast

## 2024-10-28 NOTE — PATIENT INSTRUCTIONS
Take Fluconazole as directed  Wear loose clothes and cotton understand  Take Metronidazole as directed  Take Macrobid as directed  Increase water intake  Annual GYN exam is due afer 9/18/2025

## 2024-10-31 LAB
BACTERIA UR CULT: ABNORMAL
BACTERIA UR CULT: ABNORMAL

## 2024-11-06 DIAGNOSIS — N93.9 ABNORMAL UTERINE BLEEDING (AUB): Primary | ICD-10-CM

## 2024-11-06 RX ORDER — NORETHINDRONE ACETATE AND ETHINYL ESTRADIOL 1MG-20(21)
1 KIT ORAL DAILY
Qty: 28 TABLET | Refills: 10 | Status: SHIPPED | OUTPATIENT
Start: 2024-11-06

## 2024-11-06 NOTE — PROGRESS NOTES
Terese Mac reports that OCP's were very effective at controlling AUB associated with Mirena IUD.  She took OCP's for 2-3 weeks and then stopped them and reports that AUB then resumed.  She desires to reinitiate OCP's and continue with them.    Rx sent to pharmacy with refills.

## 2024-11-26 DIAGNOSIS — T83.32XA INTRAUTERINE DEVICE (IUD) MIGRATION, INITIAL ENCOUNTER: Primary | ICD-10-CM

## 2024-12-05 ENCOUNTER — DOCUMENTATION (OUTPATIENT)
Dept: GENETICS | Facility: CLINIC | Age: 19
End: 2024-12-05

## 2024-12-07 ENCOUNTER — HOSPITAL ENCOUNTER (OUTPATIENT)
Dept: RADIOLOGY | Facility: HOSPITAL | Age: 19
Discharge: HOME | End: 2024-12-07
Attending: OBSTETRICS & GYNECOLOGY
Payer: COMMERCIAL

## 2024-12-07 DIAGNOSIS — T83.32XA INTRAUTERINE DEVICE (IUD) MIGRATION, INITIAL ENCOUNTER: ICD-10-CM

## 2024-12-07 PROCEDURE — 76856 US EXAM PELVIC COMPLETE: CPT

## 2024-12-07 PROCEDURE — 76830 TRANSVAGINAL US NON-OB: CPT

## 2024-12-09 ENCOUNTER — CONSULT (OUTPATIENT)
Dept: GENETICS | Facility: CLINIC | Age: 19
End: 2024-12-09
Payer: COMMERCIAL

## 2024-12-09 ENCOUNTER — TELEPHONE (OUTPATIENT)
Dept: GENETICS | Facility: CLINIC | Age: 19
End: 2024-12-09

## 2024-12-09 DIAGNOSIS — Z80.3 FAMILY HISTORY OF BREAST CANCER IN FIRST DEGREE RELATIVE: ICD-10-CM

## 2024-12-09 PROCEDURE — 36415 COLL VENOUS BLD VENIPUNCTURE: CPT

## 2024-12-09 PROCEDURE — 96040 PR MEDICAL GENETICS COUNSELING EACH 30 MINUTES: CPT

## 2024-12-09 NOTE — TELEPHONE ENCOUNTER
Left voicemail for patient regarding scheduled genetic counseling appointment. Encouraged a call back to my direct number.

## 2024-12-09 NOTE — PROGRESS NOTES
Pre-Test Genetic Counseling Consult Note    Patient Name: Terese Mac   /Age: 2005/19 y.o.  Referring Provider:  AMY Carter    Date of Service: 2024  Genetic Counselor: Yola Ibarra MS, [facility]  Interpretation Services: None  Location: In-person consult at [place]  Length of Visit: 30 Minutes    Terese was referred to the [facility] Cancer Risk and Genetic Assessment Program due to her family history of breast cancer . she presents today to discuss the possibility of a hereditary cancer syndrome, options for genetic testing, and implications for her and her family.    Cancer History and Treatment:   Personal History:   Oncology History    No history exists.     Screening Hx:   Breast:  Breast Imaging: None    Colon:  Colonoscopy: None    Gynecologic:  Ovaries and Uterus intact     Skin:  No current screening    Other screening: None    Reproductive History  Age at menarche: 9  Age at first live birth: Nulliparous  Menopause: Premenopausal  Hormone replacement: None    Medical and Surgical History  Pertinent surgical history:   Past Surgical History:   Procedure Laterality Date    GENIOPLASTY Bilateral 2020    Procedure: GENOPLASTY, BILATERAL SAGGITTAL SPLIT OSTEOTOMY;  Surgeon: Choco De Leon DMD;  [facility];  Service: Maxillofacial    MAXILLARY LE FORTE OSTEOTOMY Bilateral 2020    Procedure: OSTEOTOMY MAXILLARY LEFORTE I;  Surgeon: Choco De Leon DMD;  [facility];  Service: Maxillofacial    MULTIPLE TOOTH EXTRACTIONS N/A 2020    Procedure: EXTRACTION TEETH IMPACTED, 1, 16, 17 ,32;  Surgeon: Choco De Leon DMD;  [facility];  Service: Maxillofacial    TONSILLECTOMY        Pertinent medical history:  Past Medical History:   Diagnosis Date    Anxiety     Congenital jaw deformity     surgically corrected     Depression     Migraine     Pituitary tumor     Scoliosis     never req'd intervention       Other History:  Height:   Ht  "Readings from Last 1 Encounters:   10/28/24 5' 1.5\" (1.562 m)     Weight:   Wt Readings from Last 1 Encounters:   10/28/24 55.7 kg (122 lb 12.8 oz)       Relevant Family History   Patient reports no Ashkenazi Mormonism ancestry.           Please refer to the scanned pedigree in the Media Tab for a complete family history     *All history is reported as provided by the patient; records are not available for review, except where indicated.     Assessment:  We discussed sporadic, familial and hereditary cancer.  We also discussed the many factors that influence our risk for cancer such as age, environmental exposures, lifestyle choices and family history.      We reviewed the indications suggestive of a hereditary predisposition to cancer.    Of note, while testing an affected family member is most informative, it is also appropriate to test unaffected family members who meet testing criteria (NCCN Guidelines for Genetic/Familial High-Risk Assessment: Breast, Ovarian, and Pancreatic).      Genetic testing is indicated for Terese based on the following criteria: Meets NCCN V2.2025 Testing Criteria for High-Penetrance Breast Cancer Susceptibility Genes: Terese has a first-degree relative (mother) with breast cancer diagnosed at age 28 who had a maternal grandmother with ovarian cancer    The risks, benefits, and limitations of genetic testing were reviewed with the patient, as well as genetic discrimination laws, and possible test results (positive, negative, variants of uncertain significance) and their clinical implications. If positive for a mutation, options for managing cancer risk including increased surveillance, chemoprevention, and in some cases prophylactic surgery were discussed. Terese was informed that if a hereditary cancer syndrome was identified in her, first degree relatives (parents, siblings, and children) have a chance of also inheriting the condition. Genetic testing would allow for predictive genetic " testing in other relatives, who may also be at risk depending on their degree of relation.     Billing:  Most individuals pay <$100 for hereditary cancer genetic testing. If insurance covers the cost of the testing, individuals may still pay out of pocket secondary to co-pays, co-insurance, or deductibles. If the cost of the testing exceeds $100, the lab will reach out to the patient via phone or e-mail. The patient will then have the option to proceed with the testing, cancel the testing, or elect the self-pay option of $250. Terese verbalized understanding.     Plan: Patient decided to proceed with testing and provided consent.    Summary:     Sample Collection:  The patient's blood sample was drawn in the office on 12/9/24 by genetic counseling assistant Yamileth Mosher    Genetic Testing Preformed: CustomNext: Cancer® +RNAinsight® (59 genes): APC, ANT, AXIN2, BAP1, BARD1, BMPR1A, BRCA1, BRCA2, BRIP1, CDH1, CDK4, CDKN1B, CDKN2A, CHEK2, CTNNA1, DICER1, EGLN1, EPCAM, FH, FLCN, GREM1, HOXB13, KIF1B, KIT, MAX, MEN1, MET, MITF, MLH1, MSH2, MSH3, MSH6, MUTYH, NF1, NTHL1, PALB2, PDGFRA PMS2, POLD1, POLE, POT1, PTEN, RAD51C, RAD51D, RB1, RET, SDHA, SDHAF2, SDHB, SDHC, SDHD, SMAD4, SMARCA4, STK11, [id number], TP53, TSC1, TSC2, VHL     Result Call Information:  In the event that we need to reach Terese via telephone:  I confirmed the patient's mobile number on file as the best number to call with results  I confirmed with the patient that we can leave a voicemail on the provided numbers    Results take approximately 2-3 weeks to complete once test is started.    Terese will be notified via phone once results are available.      Additional recommendations for surveillance/medical management will be made pending genetic test results.

## 2024-12-16 ENCOUNTER — TELEPHONE (OUTPATIENT)
Dept: OBGYN CLINIC | Facility: CLINIC | Age: 19
End: 2024-12-16

## 2024-12-16 DIAGNOSIS — N89.8 VAGINAL IRRITATION: Primary | ICD-10-CM

## 2024-12-16 DIAGNOSIS — N89.8 VAGINAL DISCHARGE: ICD-10-CM

## 2024-12-16 RX ORDER — FLUCONAZOLE 150 MG/1
150 TABLET ORAL ONCE
Qty: 1 TABLET | Refills: 1 | Status: SHIPPED | OUTPATIENT
Start: 2024-12-16 | End: 2024-12-16

## 2024-12-16 NOTE — TELEPHONE ENCOUNTER
Pt reports several days of vaginal irritation and discharge.  Pt states it feels like when she had a yeast infection.  Safe and effective use of Fluconazole was provided.Pt was advised to make an appointment if symptoms do not resolve.  Pt verbalized understanding of all discussed.

## 2024-12-19 LAB
GENE DIS ANL INTERP-IMP: NORMAL
INTERPRETATION: NORMAL

## 2024-12-20 ENCOUNTER — TELEPHONE (OUTPATIENT)
Dept: GENETICS | Facility: CLINIC | Age: 19
End: 2024-12-20

## 2024-12-20 NOTE — TELEPHONE ENCOUNTER
Post-Test Genetic Counseling Consult Note  Today I left a voicemail for Terese reviewing the results of her genetic test for hereditary cancer. We met previously on 12/9/24 for pre-test counseling.  I encouraged Terese to call [phone] to discuss this result further.  A copy of this consult note and genetic test result will be shared with the patient.      SUMMARY:    Test(s): CustomNext: Cancer® +RNAinsight® (59 genes): APC, ANT, AXIN2, BAP1, BARD1, BMPR1A, BRCA1, BRCA2, BRIP1, CDH1, CDK4, CDKN1B, CDKN2A, CHEK2, CTNNA1, DICER1, EGLN1, EPCAM, FH, FLCN, GREM1, HOXB13, KIF1B, KIT, MAX, MEN1, MET, MITF, MLH1, MSH2, MSH3, MSH6, MUTYH, NF1, NTHL1, PALB2, PDGFRA PMS2, POLD1, POLE, POT1, PTEN, RAD51C, RAD51D, RB1, RET, SDHA, SDHAF2, SDHB, SDHC, SDHD, SMAD4, SMARCA4, STK11, CRZH794, TP53, TSC1, TSC2, VHL     Result: Negative - No Clinically Significant Variants Detected      Assessment:   A negative result significantly reduces the likelihood that Terese has a hereditary cancer syndrome. However, this testing is unable to completely rule out the presence of hereditary cancer. It remains possible that:  There is a variant in an area of a gene which was not tested or there is a variant not detectable due to technical limitations of this test.     There is a variant in another gene that was not included in this test or in a gene not known to be linked to cancer or tumors.   A family member has a genetic variant that the patient did not inherit.   The cancer in the family is sporadic and is related to non-hereditary factors.     Risk based on Family History:  Terese's risk of breast cancer may still be increased based on her personal risk factors and family history. Despite a negative test result, we are able to run risk models to better estimate Terese's lifetime risk of developing breast cancer. I ran the Tyrer-Cuzick model based on the information Terese provided during our pre-test counseling session:    Tyrer-Cuzick model:  Estimated lifetime risk, to age 85, for breast cancer is 22.6%% compared to approximately 12-13% general population risk     The Tyrer-Cuzick model predicts that Terese's lifetime risk for breast cancer is at least 20%. Therefore, she qualifies for increased breast cancer screening, which is outlined below in the plan section.    Risks and Testing for Family Members:  Despite a negative result, Terese's first-degree relatives may be at increased risk for the cancers based on the family history. We recommend they discuss screening and management recommendations with their healthcare providers.    If Terese has any affected family members with a cancer diagnosis, especially at a young age, they may still consider genetic testing. Relatives who wish to pursue genetic testing can reach out to the [facility] [phone] to schedule an appointment or visit [email or web address] to identify a local genetic counselor.      Additional Information:  A healthy lifestyle will improve overall health and reduce risk for illness. Eating a healthy diet and exercising for 4 hours per week is recommended. Both diet and exercise have been shown to help maintain a healthy weight. Postmenopausal women who are overweight are at higher risk for breast cancer. Moderate to heavy alcohol use can increase the risk for some cancers. Smoking cigarettes can also increase risk for breast, lung, prostate, pancreatic and other cancers.      Plan:   Recommendations for Terese are outlined below based on her family history, however the surveillance and medical management should continue as clinically indicated and as determined appropriate by her healthcare providers.    Residual Risk for Breast Cancer:  Based on her reported family history, Terese's estimated residual lifetime risk for breast cancer is 20% or higher, which puts her in a high-risk category. The National Comprehensive Cancer Network (NCCN) recommends that women at high  risk for breast cancer follow the below screening interval.  For this reason, I offered placing a referral for Terese to Surgical Oncology to meet with a high risk provider to discuss increased breast cancer screening.    Breast cancer screening per the NCCN Breast Cancer Risk Reduction as of 12/20/24  Screening for women who have a lifetime risk of >20% as defined by models that are largely dependent on family history:  Breast awareness  Clinical encounter every 6-12 months   Consider referral to a breast specialist   Annual screening mammogram.  Tomosynthesis is recommended if available beginning 10 years prior to the youngest family member but not less than age 30 years  Annual breast MRI to begin 10 years prior to the youngest family member but not less than age 25 years  Consider whole breast ultrasound or contrast-enhanced mammography for those who qualify for but cannot undergo MRI    Negative Result: Terese was strongly encouraged to contact us regarding any changes in her personal or family history of cancer as these changes could alter our recommendation regarding genetic testing and/or cancer screening. In addition, Terese is encouraged to reach out if she would like a referral placed to Surgical Oncology to meet with a high risk provider to discuss increased breast cancer screening.

## 2024-12-27 DIAGNOSIS — R30.0 DYSURIA: Primary | ICD-10-CM

## 2024-12-27 RX ORDER — NITROFURANTOIN 25; 75 MG/1; MG/1
100 CAPSULE ORAL 2 TIMES DAILY
Qty: 14 CAPSULE | Refills: 0 | Status: SHIPPED | OUTPATIENT
Start: 2024-12-27 | End: 2025-01-03

## 2024-12-27 RX ORDER — PHENAZOPYRIDINE HYDROCHLORIDE 100 MG/1
100 TABLET, FILM COATED ORAL 3 TIMES DAILY PRN
Qty: 6 TABLET | Refills: 0 | Status: SHIPPED | OUTPATIENT
Start: 2024-12-27 | End: 2024-12-29

## 2024-12-30 ENCOUNTER — TELEPHONE (OUTPATIENT)
Dept: OBGYN CLINIC | Facility: CLINIC | Age: 19
End: 2024-12-30

## 2024-12-30 DIAGNOSIS — Z80.3 FAMILY HISTORY OF BREAST CANCER IN FIRST DEGREE RELATIVE: Primary | ICD-10-CM

## 2024-12-30 NOTE — TELEPHONE ENCOUNTER
I discussed with patient that I recommend referral to Breast Clinic and for her to have breast exam every 6 months alternating between GYN and Breast Clinic.  Plan to initiate breast MRI at age 23 and mammogram at age 28.  Patient is agreeable to plan.  Referral ordered for consultation with Breast Clinic.

## 2025-01-02 ENCOUNTER — TELEPHONE (OUTPATIENT)
Dept: HEMATOLOGY ONCOLOGY | Facility: CLINIC | Age: 20
End: 2025-01-02

## 2025-01-02 NOTE — TELEPHONE ENCOUNTER
Referral to Surgical Oncology received.  Chart reviewed by  for Surgical oncology at this time.       Diagnosis: Z80.3 (ICD-10-CM) - Family history of breast cancer in first degree relative    After review of chart, instructions for scheduling added to referral and sent to be scheduled as advised.

## 2025-01-14 NOTE — PROGRESS NOTES
Terese Mac   19 y.o. Female MRN: [id number]  Encounter: [id number]    New Patient Consult Note    CC: Rathke's cleft cyst      Referring Provider:   Bharati Ashby Md  [address]  [address]  [place] [id number]      ASSESSMENT AND PLAN  Assessment:  Terese Mac is a 19 y.o. 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    Plan:  1. Rathke's cleft cyst (HCC)  Overview:  MRI pituitary (12/6/16): Right pituitary mass noted  MRI pituitary (12/19/22): Right pituitary mass 6 mm in size, Rathke cleft cyst vs. microadenoma  MRI pituitary (8/22/23): Right pituitary mass 7 x 3 mm in size, overall stable since 2016  Assessment & Plan:  As noted on MRI pituitary since 2016 and overall stable  Will obtain an anterior pituitary panel including a TSH, free T4, ACTH, AM cortisol, IGF-1, and prolactin level to exclude pituitary hypofunction. Discussed appropriate timing of these labs (between 7-9 am, fasting) and avoiding stress, sexual activity, exercise, and breast stimulation for 24-48 hours prior to the prolactin lab draw.  Will defer an FSH and LH level given the presence of an IUD  Given the overall stability of her Rathke's cleft cyst since 2016, will defer repeat MRI of the pituitary gland at this time  Continue regular follow up with ophthalmology   Advised patient to go to the emergency room if she experiences sudden severe headaches, visual changes, or altered mentation/confusion  RTC in 1 year for a follow up visit with repeat anterior pituitary panel  Orders:  -     Ambulatory Referral to Endocrinology  -     ACTH; Future  -     Cortisol Level, AM Specimen; Future  -     Prolactin; Future  -     Insulin-like growth factor 1 (IGF-1); Future  -     TSH, 3rd generation; Future  -     T4, free; Future  2. Confusion  Overview:  Will evaluate for underlying vitamin B12 and iron deficiency with a vitamin B12 level and iron panel   Orders:  -     Iron Panel (Includes Ferritin, Iron Sat%,  Iron, and TIBC); Future  -     Vitamin B12; Future      HISTORY OF PRESENT ILLNESS  HPI:  Terese Mac is a 19 y.o. female with a past medical history significant for Rathke's cleft cyst, scoliosis, anxiety, depression, and migraines who presents for initial evaluation of her Rathke's cleft cyst. She is referred by her PCP, Dr. Bharati Ashby. She is accompanied to this visit by her boyfriend who provides additional history.    She reports a history of headaches since a young age for which an MRI was ordered in 2016. A right pituitary mass was noted on MRI pitutiary on 12/6/16. This was again noted on repeat MRI pituitary on 12/19/22 and was noted to be 6 mm in size. On MRI pituitary from 8/22/23, this measured 7 x 3 mm and overall stable since 2016. Previous biochemical workup has demonstrated a mildly elevated prolactin level of 35.49 and TSH of 1.959 on 8/17/23.    She sees ophthalmology regularly with last exam being normal a couple of months ago.     She endorses symptoms including headaches, nausea, occasional orthostasis, dry skin along her arms and back, constipation, occasional anxiety, difficulty falling and staying asleep, recent confusion/memory changes, and occasional fatigue.     Her FDLMP was around March 2024 and she has had irregular spotting since her IUD was placed, with last episode 2-3 weeks ago. She had menarche at age 9 and had oligomenorrhea since then. She is not seeking fertility at this time.      Family history: Mother with brain mass, maternal grandmother with type 2 diabetes   Tobacco: Denies   Alcohol: Social  Illicit drugs: Denies   Occupation: MA        Review of Systems   Constitutional:  Positive for fatigue. Negative for appetite change, chills, fever and unexpected weight change.   HENT:  Negative for ear pain and sore throat.    Eyes:  Negative for pain and visual disturbance.   Respiratory:  Negative for cough and shortness of breath.    Cardiovascular:  Negative for chest  pain and palpitations.   Gastrointestinal:  Positive for constipation. Negative for abdominal pain and vomiting.   Genitourinary:  Negative for dysuria and hematuria.   Musculoskeletal:  Negative for arthralgias, back pain and myalgias.   Skin:  Negative for color change and rash.        Dry skin   Neurological:  Positive for dizziness and headaches. Negative for seizures, syncope and weakness.   Psychiatric/Behavioral:  Positive for confusion, decreased concentration and sleep disturbance. The patient is nervous/anxious.    All other systems reviewed and are negative.      Historical Information   Past Medical History:   Diagnosis Date    Anxiety     Congenital jaw deformity     surgically corrected 2020    Depression     Migraine     Pituitary tumor     Scoliosis     never req'd intervention     Past Surgical History:   Procedure Laterality Date    GENIOPLASTY Bilateral 9/8/2020    Procedure: GENOPLASTY, BILATERAL SAGGITTAL SPLIT OSTEOTOMY;  Surgeon: Choco De Leon DMD;  [facility];  Service: Maxillofacial    MAXILLARY LE FORTE OSTEOTOMY Bilateral 9/8/2020    Procedure: OSTEOTOMY MAXILLARY LEFORTE I;  Surgeon: Choco De Leon DMD;  [facility];  Service: Maxillofacial    MULTIPLE TOOTH EXTRACTIONS N/A 9/8/2020    Procedure: EXTRACTION TEETH IMPACTED, 1, 16, 17 ,32;  Surgeon: Choco De Leon DMD;  [facility];  Service: Maxillofacial    TONSILLECTOMY  2009     Social History   Social History     Substance and Sexual Activity   Alcohol Use Yes    Comment: couple times/month     Social History     Substance and Sexual Activity   Drug Use Never     Social History     Tobacco Use   Smoking Status Never   Smokeless Tobacco Never     Family History:   Family History   Problem Relation Age of Onset    Breast cancer Mother 27    Seizures Mother     Migraines Mother     No Known Problems Father     No Known Problems Brother     No Known Problems Brother     Thyroid disease Maternal  "Grandmother     Diabetes Maternal Grandmother     Hypertension Maternal Grandmother     Thyroid disease Paternal Aunt     Diabetes Paternal Uncle     Colon cancer Neg Hx     Ovarian cancer Neg Hx        Meds/Allergies   Current Outpatient Medications   Medication Sig Dispense Refill    Levonorgestrel (MIRENA) 20 MCG/DAY IUD 1 each by Intrauterine Device route Once every 8 years      norethindrone-ethinyl estradiol (JUNEL FE 1/20) 1-20 MG-MCG per tablet Take 1 tablet by mouth daily 28 tablet 10     No current facility-administered medications for this visit.     Allergies   Allergen Reactions    Zithromax [Azithromycin]        OBJECTIVE  Visit Vitals  /78 (BP Location: Left arm, Patient Position: Sitting, Cuff Size: Large)   Pulse 78   Ht 5' 1.5\" (1.562 m)   Wt 56.2 kg (124 lb)   SpO2 97%   BMI 23.05 kg/m²   OB Status Birth Control   Smoking Status Never   BSA 1.55 m²       Physical Exam  Constitutional:       Appearance: Normal appearance.   HENT:      Head: Normocephalic and atraumatic.      Mouth/Throat:      Mouth: Mucous membranes are moist.      Pharynx: Oropharynx is clear.   Eyes:      General: Visual field deficit present.      Extraocular Movements: Extraocular movements intact.      Pupils: Pupils are equal, round, and reactive to light.   Cardiovascular:      Rate and Rhythm: Normal rate and regular rhythm.      Pulses: Normal pulses.      Heart sounds: Normal heart sounds.   Pulmonary:      Effort: Pulmonary effort is normal. No respiratory distress.      Breath sounds: Normal breath sounds. No wheezing, rhonchi or rales.   Abdominal:      General: There is no distension.      Tenderness: There is no abdominal tenderness. There is no guarding or rebound.   Musculoskeletal:         General: No swelling or tenderness. Normal range of motion.      Cervical back: Normal range of motion and neck supple. No tenderness.      Right lower leg: No edema.      Left lower leg: No edema.   Skin:     General: " Skin is warm and dry.      Findings: No abrasion or wound.   Neurological:      General: No focal deficit present.      Mental Status: She is alert and oriented to person, place, and time.   Psychiatric:         Mood and Affect: Mood normal.         Behavior: Behavior normal.       Lab Results:   Component      Latest Ref Rng 8/17/2023   PROLACTIN      3.34 - 26.72 ng/mL 35.49 (H)    TSH 3RD GENERATON      0.450 - 4.500 uIU/mL 1.959       Legend:  (H) High      Imaging Studies:  Results for orders placed during the hospital encounter of 08/22/23    MRI brain pituitary wo and w contrast    Impression  No acute intracranial pathology.  Subcentimeter hypoenhancing intrinsically T1 hyperintense pituitary lesion is stable since 2016. Favor Rathke's cleft cyst over a pituitary microadenoma.    Workstation performed: [id number]      Discussed with the patient and all questioned fully answered. [unfilled] will call me if any problems arise.    Counseled patient on diagnostic results, prognosis, risk and benefit of treatment options, instruction for management, importance of treatment compliance, risk factor reduction, and impressions.

## 2025-01-15 ENCOUNTER — CONSULT (OUTPATIENT)
Dept: ENDOCRINOLOGY | Facility: CLINIC | Age: 20
End: 2025-01-15
Payer: COMMERCIAL

## 2025-01-15 VITALS
DIASTOLIC BLOOD PRESSURE: 78 MMHG | SYSTOLIC BLOOD PRESSURE: 120 MMHG | BODY MASS INDEX: 22.82 KG/M2 | WEIGHT: 124 LBS | HEART RATE: 78 BPM | OXYGEN SATURATION: 97 % | HEIGHT: 62 IN

## 2025-01-15 DIAGNOSIS — R42 DIZZINESS: ICD-10-CM

## 2025-01-15 DIAGNOSIS — E23.6 RATHKE'S CLEFT CYST (HCC): Primary | ICD-10-CM

## 2025-01-15 PROBLEM — R41.0 CONFUSION: Status: ACTIVE | Noted: 2025-01-15

## 2025-01-15 PROCEDURE — 99244 OFF/OP CNSLTJ NEW/EST MOD 40: CPT | Performed by: INTERNAL MEDICINE

## 2025-01-15 NOTE — PATIENT INSTRUCTIONS
Please have the lab work done between 7 to 9 am while fasting.   Instructions:   Please get lab work done as ordered in the morning.   Please ensure that you get proper sleep, are not stressed, avoid strenous exercise, avoid sexual intercourse and any form of breast stimulation in the preceding 24-48 hrs before the test.    I will also check your vitamin b12 and iron levels to make sure those are okay    If you ever have any severe headaches, vision changes, or sudden confusion or altered mentation go to the emergency room

## 2025-01-15 NOTE — ASSESSMENT & PLAN NOTE
As noted on MRI pituitary since 2016 and overall stable  Will obtain an anterior pituitary panel including a TSH, free T4, ACTH, AM cortisol, IGF-1, and prolactin level to exclude pituitary hypofunction. Discussed appropriate timing of these labs (between 7-9 am, fasting) and avoiding stress, sexual activity, exercise, and breast stimulation for 24-48 hours prior to the prolactin lab draw.  Will defer an FSH and LH level given the presence of an IUD  Given the overall stability of her Rathke's cleft cyst since 2016, will defer repeat MRI of the pituitary gland at this time  Continue regular follow up with ophthalmology   Advised patient to go to the emergency room if she experiences sudden severe headaches, visual changes, or altered mentation/confusion  RTC in 1 year for a follow up visit with repeat anterior pituitary panel

## 2025-01-24 ENCOUNTER — OFFICE VISIT (OUTPATIENT)
Dept: SURGICAL ONCOLOGY | Facility: CLINIC | Age: 20
End: 2025-01-24
Payer: COMMERCIAL

## 2025-01-24 VITALS
TEMPERATURE: 97.6 F | WEIGHT: 121 LBS | OXYGEN SATURATION: 98 % | BODY MASS INDEX: 22.26 KG/M2 | HEIGHT: 62 IN | HEART RATE: 77 BPM | DIASTOLIC BLOOD PRESSURE: 64 MMHG | SYSTOLIC BLOOD PRESSURE: 108 MMHG

## 2025-01-24 DIAGNOSIS — Z12.39 BREAST CANCER SCREENING OTHER THAN MAMMOGRAM: ICD-10-CM

## 2025-01-24 DIAGNOSIS — Z91.89 AT HIGH RISK FOR BREAST CANCER: Primary | ICD-10-CM

## 2025-01-24 DIAGNOSIS — Z80.3 FAMILY HISTORY OF BREAST CANCER IN FIRST DEGREE RELATIVE: ICD-10-CM

## 2025-01-24 PROCEDURE — G2211 COMPLEX E/M VISIT ADD ON: HCPCS

## 2025-01-24 PROCEDURE — 99244 OFF/OP CNSLTJ NEW/EST MOD 40: CPT

## 2025-01-24 RX ORDER — FLUCONAZOLE 150 MG/1
1 TABLET ORAL DAILY
COMMUNITY
Start: 2025-01-08 | End: 2025-01-27

## 2025-01-24 NOTE — LETTER
2025     AMY Carter  [address] [address]    Patient: Terese Mac   YOB: 2005   Date of Visit: 2025       Dear Dr. Babin:    Thank you for referring Terese Mac to me for evaluation. Below are my notes for this consultation.    If you have questions, please do not hesitate to call me. I look forward to following your patient along with you.         Sincerely,        AMY Pascual        CC: No Recipients    AMY Pascual  2025 10:47 AM  Sign when Signing Visit  Name: Terese Mac      : 2005      MRN: [id number]  Encounter Provider: AMY Pascual  Encounter Date: 2025   Encounter department: [facility]  :  Assessment & Plan  Family history of breast cancer in first degree relative    Orders:  •  Ambulatory Referral to Surgical Oncology  •  MRI breast bilateral w and wo contrast w cad; Future    At high risk for breast cancer  Patient's lifetime TC risk is 23%. Per NCCN guidelines, early breast cancer screening should start 10 years prior to the youngest diagnosed relative, which would be now. Residual lifetime risk over 20% indicates breast MRI as an appropriate modality.  We will plan to have this performed in the near future, and I will tentatively see her again in 1 year for another clinical exam.  Orders:  •  MRI breast bilateral w and wo contrast w cad; Future    Breast cancer screening other than mammogram    Orders:  •  MRI breast bilateral w and wo contrast w cad; Future        History of Present Illness  HPI  Terese Mac is a 19 y.o. female who presents today for high risk breast consultation. She reports that her mother was diagnosed with breast cancer at the age of 28, and she has been recommended to consider early screening.  The patient is not aware of any other family history of cancer.  The patient has undergone genetic  "testing and is fortunately negative.  However, her mother's genetic status is unknown. She denies any history of breast issues, biopsies or surgeries, and does not have any current complaints referable to her breasts.  She reports menarche at age 9 and has never been pregnant.  I have calculated her TC risk to be 23%.      Review of Systems   Constitutional:  Negative for activity change, appetite change, fatigue and unexpected weight change.   HENT: Negative.     Respiratory: Negative.     Cardiovascular: Negative.    Gastrointestinal: Negative.    Musculoskeletal: Negative.    Skin: Negative.    Neurological: Negative.    Hematological: Negative.    Psychiatric/Behavioral: Negative.            Objective  /64   Pulse 77   Temp 97.6 °F (36.4 °C)   Ht 5' 1.5\" (1.562 m)   Wt 54.9 kg (121 lb)   SpO2 98%   BMI 22.49 kg/m²      Physical Exam  Vitals reviewed.   Constitutional:       General: She is not in acute distress.     Appearance: Normal appearance. She is normal weight. She is not ill-appearing or toxic-appearing.   HENT:      Head: Normocephalic and atraumatic.   Eyes:      General: No scleral icterus.  Cardiovascular:      Rate and Rhythm: Normal rate.   Pulmonary:      Effort: Pulmonary effort is normal.   Chest:   Breasts:     Right: Normal.      Left: Normal.      Comments: Breasts are smooth and symmetric bilaterally. There are no dominant masses, nodules, skin changes or tenderness on exam. I do not appreciate any adenopathy.  Musculoskeletal:         General: Normal range of motion.      Cervical back: Normal range of motion and neck supple.   Lymphadenopathy:      Cervical: No cervical adenopathy.      Upper Body:      Right upper body: No supraclavicular, axillary or pectoral adenopathy.      Left upper body: No supraclavicular, axillary or pectoral adenopathy.   Skin:     General: Skin is warm and dry.   Neurological:      General: No focal deficit present.      Mental Status: She is alert " and oriented to person, place, and time.   Psychiatric:         Mood and Affect: Mood normal.         Behavior: Behavior normal.         Thought Content: Thought content normal.         Judgment: Judgment normal.

## 2025-01-27 PROBLEM — Z91.89 AT HIGH RISK FOR BREAST CANCER: Status: ACTIVE | Noted: 2025-01-27

## 2025-01-27 NOTE — ASSESSMENT & PLAN NOTE
Patient's lifetime TC risk is 23%. Per NCCN guidelines, early breast cancer screening should start 10 years prior to the youngest diagnosed relative, which would be now. Residual lifetime risk over 20% indicates breast MRI as an appropriate modality.  We will plan to have this performed in the near future, and I will tentatively see her again in 1 year for another clinical exam.  Orders:  •  MRI breast bilateral w and wo contrast w cad; Future

## 2025-01-27 NOTE — PROGRESS NOTES
Name: Terese Mac      : 2005      MRN: [id number]  Encounter Provider: AMY Pascual  Encounter Date: 2025   Encounter department: [facility]  :  Assessment & Plan  Family history of breast cancer in first degree relative    Orders:  •  Ambulatory Referral to Surgical Oncology  •  MRI breast bilateral w and wo contrast w cad; Future    At high risk for breast cancer  Patient's lifetime TC risk is 23%. Per NCCN guidelines, early breast cancer screening should start 10 years prior to the youngest diagnosed relative, which would be now. Residual lifetime risk over 20% indicates breast MRI as an appropriate modality.  We will plan to have this performed in the near future, and I will tentatively see her again in 1 year for another clinical exam.  Orders:  •  MRI breast bilateral w and wo contrast w cad; Future    Breast cancer screening other than mammogram    Orders:  •  MRI breast bilateral w and wo contrast w cad; Future        History of Present Illness   HPI  Terese Mac is a 19 y.o. female who presents today for high risk breast consultation. She reports that her mother was diagnosed with breast cancer at the age of 28, and she has been recommended to consider early screening.  The patient is not aware of any other family history of cancer.  The patient has undergone genetic testing and is fortunately negative.  However, her mother's genetic status is unknown. She denies any history of breast issues, biopsies or surgeries, and does not have any current complaints referable to her breasts.  She reports menarche at age 9 and has never been pregnant.  I have calculated her TC risk to be 23%.      Review of Systems   Constitutional:  Negative for activity change, appetite change, fatigue and unexpected weight change.   HENT: Negative.     Respiratory: Negative.     Cardiovascular: Negative.    Gastrointestinal: Negative.    Musculoskeletal:  "Negative.    Skin: Negative.    Neurological: Negative.    Hematological: Negative.    Psychiatric/Behavioral: Negative.            Objective   /64   Pulse 77   Temp 97.6 °F (36.4 °C)   Ht 5' 1.5\" (1.562 m)   Wt 54.9 kg (121 lb)   SpO2 98%   BMI 22.49 kg/m²      Physical Exam  Vitals reviewed.   Constitutional:       General: She is not in acute distress.     Appearance: Normal appearance. She is normal weight. She is not ill-appearing or toxic-appearing.   HENT:      Head: Normocephalic and atraumatic.   Eyes:      General: No scleral icterus.  Cardiovascular:      Rate and Rhythm: Normal rate.   Pulmonary:      Effort: Pulmonary effort is normal.   Chest:   Breasts:     Right: Normal.      Left: Normal.      Comments: Breasts are smooth and symmetric bilaterally. There are no dominant masses, nodules, skin changes or tenderness on exam. I do not appreciate any adenopathy.  Musculoskeletal:         General: Normal range of motion.      Cervical back: Normal range of motion and neck supple.   Lymphadenopathy:      Cervical: No cervical adenopathy.      Upper Body:      Right upper body: No supraclavicular, axillary or pectoral adenopathy.      Left upper body: No supraclavicular, axillary or pectoral adenopathy.   Skin:     General: Skin is warm and dry.   Neurological:      General: No focal deficit present.      Mental Status: She is alert and oriented to person, place, and time.   Psychiatric:         Mood and Affect: Mood normal.         Behavior: Behavior normal.         Thought Content: Thought content normal.         Judgment: Judgment normal.           "

## 2025-01-27 NOTE — ASSESSMENT & PLAN NOTE
Orders:  •  Ambulatory Referral to Surgical Oncology  •  MRI breast bilateral w and wo contrast w cad; Future

## 2025-01-29 ENCOUNTER — APPOINTMENT (OUTPATIENT)
Dept: LAB | Facility: HOSPITAL | Age: 20
End: 2025-01-29
Payer: COMMERCIAL

## 2025-01-29 ENCOUNTER — RESULTS FOLLOW-UP (OUTPATIENT)
Dept: ENDOCRINOLOGY | Facility: CLINIC | Age: 20
End: 2025-01-29

## 2025-01-29 DIAGNOSIS — R42 DIZZINESS: ICD-10-CM

## 2025-01-29 DIAGNOSIS — E61.1 IRON DEFICIENCY: Primary | ICD-10-CM

## 2025-01-29 DIAGNOSIS — E23.6 RATHKE'S CLEFT CYST (HCC): ICD-10-CM

## 2025-01-29 LAB
CORTIS AM PEAK SERPL-MCNC: 15.5 UG/DL (ref 6.7–22.6)
FERRITIN SERPL-MCNC: 5 NG/ML (ref 11–307)
IRON SATN MFR SERPL: 35 % (ref 15–50)
IRON SERPL-MCNC: 117 UG/DL (ref 50–212)
PROLACTIN SERPL-MCNC: 21.45 NG/ML (ref 3.34–26.72)
T4 FREE SERPL-MCNC: 0.98 NG/DL (ref 0.61–1.12)
TIBC SERPL-MCNC: 331.8 UG/DL (ref 250–450)
TRANSFERRIN SERPL-MCNC: 237 MG/DL (ref 203–362)
TSH SERPL DL<=0.05 MIU/L-ACNC: 1.78 UIU/ML (ref 0.45–4.5)
UIBC SERPL-MCNC: 215 UG/DL (ref 155–355)
VIT B12 SERPL-MCNC: 402 PG/ML (ref 180–914)

## 2025-01-29 PROCEDURE — 36415 COLL VENOUS BLD VENIPUNCTURE: CPT

## 2025-01-29 PROCEDURE — 84443 ASSAY THYROID STIM HORMONE: CPT

## 2025-01-29 PROCEDURE — 83550 IRON BINDING TEST: CPT

## 2025-01-29 PROCEDURE — 83540 ASSAY OF IRON: CPT

## 2025-01-29 PROCEDURE — 84305 ASSAY OF SOMATOMEDIN: CPT

## 2025-01-29 PROCEDURE — 82728 ASSAY OF FERRITIN: CPT

## 2025-01-29 PROCEDURE — 82607 VITAMIN B-12: CPT

## 2025-01-29 PROCEDURE — 84439 ASSAY OF FREE THYROXINE: CPT

## 2025-01-29 PROCEDURE — 82533 TOTAL CORTISOL: CPT

## 2025-01-29 PROCEDURE — 82024 ASSAY OF ACTH: CPT

## 2025-01-29 PROCEDURE — 84146 ASSAY OF PROLACTIN: CPT

## 2025-01-30 LAB — ACTH PLAS-MCNC: 16 PG/ML (ref 7.2–63.3)

## 2025-01-31 LAB — IGF-I SERPL-MCNC: 272 NG/ML (ref 101–347)

## 2025-02-07 ENCOUNTER — TELEPHONE (OUTPATIENT)
Age: 20
End: 2025-02-07

## 2025-02-07 NOTE — TELEPHONE ENCOUNTER
Left message for pt regarding rescheduling of derm appt for 2/20/25  offering 3/18-3/19-3/20 until 2:30PM   please reschedule or contact office for rescheduling

## 2025-02-24 DIAGNOSIS — R50.9 FEVER, UNSPECIFIED FEVER CAUSE: Primary | ICD-10-CM

## 2025-02-25 ENCOUNTER — APPOINTMENT (OUTPATIENT)
Dept: LAB | Facility: CLINIC | Age: 20
End: 2025-02-25
Payer: COMMERCIAL

## 2025-02-25 PROCEDURE — 87636 SARSCOV2 & INF A&B AMP PRB: CPT | Performed by: OBSTETRICS & GYNECOLOGY

## 2025-02-26 LAB
FLUAV RNA RESP QL NAA+PROBE: NEGATIVE
FLUBV RNA RESP QL NAA+PROBE: NEGATIVE
SARS-COV-2 RNA RESP QL NAA+PROBE: NEGATIVE

## 2025-02-28 ENCOUNTER — APPOINTMENT (OUTPATIENT)
Dept: LAB | Facility: HOSPITAL | Age: 20
End: 2025-02-28
Payer: COMMERCIAL

## 2025-02-28 DIAGNOSIS — Z11.3 SCREEN FOR STD (SEXUALLY TRANSMITTED DISEASE): Primary | ICD-10-CM

## 2025-02-28 PROCEDURE — 87591 N.GONORRHOEAE DNA AMP PROB: CPT | Performed by: NURSE PRACTITIONER

## 2025-02-28 PROCEDURE — 87491 CHLMYD TRACH DNA AMP PROBE: CPT | Performed by: NURSE PRACTITIONER

## 2025-03-03 LAB
C TRACH DNA SPEC QL NAA+PROBE: NEGATIVE
N GONORRHOEA DNA SPEC QL NAA+PROBE: NEGATIVE

## 2025-03-14 ENCOUNTER — OFFICE VISIT (OUTPATIENT)
Dept: HEMATOLOGY ONCOLOGY | Facility: CLINIC | Age: 20
End: 2025-03-14
Payer: COMMERCIAL

## 2025-03-14 ENCOUNTER — TELEPHONE (OUTPATIENT)
Dept: HEMATOLOGY ONCOLOGY | Facility: CLINIC | Age: 20
End: 2025-03-14

## 2025-03-14 VITALS
DIASTOLIC BLOOD PRESSURE: 70 MMHG | WEIGHT: 125 LBS | SYSTOLIC BLOOD PRESSURE: 106 MMHG | TEMPERATURE: 97.9 F | BODY MASS INDEX: 23 KG/M2 | RESPIRATION RATE: 18 BRPM | OXYGEN SATURATION: 99 % | HEIGHT: 62 IN | HEART RATE: 77 BPM

## 2025-03-14 DIAGNOSIS — E61.1 IRON DEFICIENCY: Primary | ICD-10-CM

## 2025-03-14 DIAGNOSIS — R63.0 DECREASED APPETITE: ICD-10-CM

## 2025-03-14 DIAGNOSIS — K59.00 CONSTIPATION, UNSPECIFIED CONSTIPATION TYPE: ICD-10-CM

## 2025-03-14 PROCEDURE — 99243 OFF/OP CNSLTJ NEW/EST LOW 30: CPT

## 2025-03-14 RX ORDER — SODIUM CHLORIDE 9 MG/ML
20 INJECTION, SOLUTION INTRAVENOUS ONCE
OUTPATIENT
Start: 2025-03-28

## 2025-03-14 NOTE — ASSESSMENT & PLAN NOTE
Orders:    Ambulatory Referral to Hematology / Oncology    CBC; Future    Iron Panel (Includes Ferritin, Iron Sat%, Iron, and TIBC); Future    Ambulatory referral to Gastroenterology; Future

## 2025-03-14 NOTE — PATIENT INSTRUCTIONS
"Please call GI to make an appt - [phone]    Patient Education     Good food sources of iron   The Basics   Written by the doctors and editors at [facility]   What is iron? -- Iron is a mineral that your body needs to make \"hemoglobin.\" Hemoglobin is a protein in the blood. It helps red blood cells carry oxygen to all parts of the body.  The amount of iron that you need in your diet depends on your age and sex. Your overall health also plays a role in how much iron you need each day.  Some people do not have enough iron. This is called \"iron deficiency.\" Getting plenty of iron through your diet can help prevent iron deficiency. But if you already have too little iron, eating foods with iron will not be enough to treat it. In this case, your doctor will prescribe extra iron to correct your levels.  What foods are good sources of iron? -- It depends on age:   Babies - It's important to make sure that babies get enough iron, especially if they drink breast milk.   Babies who drink breast milk need extra iron by the time they are 4 months old. This could come from an iron supplement, or solid foods that are high in iron (such as meats or iron-fortified baby cereal).   Babies who do not drink breast milk should drink an \"iron-fortified\" formula. (Formulas labeled \"low-iron\" will not provide enough iron.) Cow's milk and other types of milk do not have the right amount of iron or other nutrients for babies younger than 1 year old.   When a baby starts eating solid foods, include good sources of iron. Examples include meats or iron-fortified baby cereal.   Wait until your baby is at least 1 year old before switching from breast milk or formula to cow's milk or another type of milk.   Children and adults - Eating a healthy, balanced diet will give most people enough iron. Meat is a common source of iron. But if you don't eat meat, you should eat plenty of other foods that are rich in iron. Below are some examples of foods " "that are considered \"high\" or \"moderate\" in iron.   High-iron grains - Whole-wheat breads, cereals, and bagels. Flour tortillas, biscuits, English or bran muffins, iron-fortified bran, pretzels, frozen waffles. Hot cereals like oatmeal, cream of wheat, or grits.   Moderate-iron grains - Bhumi bread, egg noodles, whole-wheat pasta, hamburger and hot dog buns.   Moderate-iron fruits - Dried apricots, dried figs, prune juice.   High-iron vegetables - Spinach, soybeans, canned pumpkin.   Moderate-iron vegetables - Asparagus, Stirling sprouts, mushrooms, green peas, white or sweet potato with the skin, tomato sauce, beets, beans such as garbanzo or lima. Greens such as jimbo, turnip, kale, beet, or Swiss chard.   High-iron meats and other proteins - Beef, veal, lamb, pork, beef or chicken liver, clams, sardines, oysters, shrimp, tofu, baked beans with pork, lentils, tahini, tempeh. Beans such as kidney, lima, navy, or white.   Moderate-iron meats and other proteins - Chicken breast, turkey, eggs, fresh or canned tuna or mackerel.   Other high-iron foods - Pumpkin seeds.   Other moderate-iron foods - Molasses, soy milk, seeds such as sesame or sunflower. Nuts such as almonds, pistachios, cashews, and walnuts.  What else should I know? -- Some medicines and foods can change how much iron you absorb from your food. Talk to your doctor, nurse, or dietitian if you have questions.  All topics are updated as new evidence becomes available and our peer review process is complete.  This topic retrieved from [employer] on: Mar 20, 2024.  Topic 342641 Version 2.0  Release: 32.2.4 - C32.78  © 2024 UpToDate, Inc. and/or its affiliates. All rights reserved.  Consumer Information Use and Disclaimer   Disclaimer: This generalized information is a limited summary of diagnosis, treatment, and/or medication information. It is not meant to be comprehensive and should be used as a tool to help the user understand and/or assess potential " diagnostic and treatment options. It does NOT include all information about conditions, treatments, medications, side effects, or risks that may apply to a specific patient. It is not intended to be medical advice or a substitute for the medical advice, diagnosis, or treatment of a health care provider based on the health care provider's examination and assessment of a patient's specific and unique circumstances. Patients must speak with a health care provider for complete information about their health, medical questions, and treatment options, including any risks or benefits regarding use of medications. This information does not endorse any treatments or medications as safe, effective, or approved for treating a specific patient. UpToDate, Inc. and its affiliates disclaim any warranty or liability relating to this information or the use thereof.The use of this information is governed by the Terms of Use, available at https://www.woltersStorifyuwer.com/en/know/clinical-effectiveness-terms. 2024© UpToDate, Inc. and its affiliates and/or licensors. All rights reserved.  Copyright   © 2024 UpToDate, Inc. and/or its affiliates. All rights reserved.

## 2025-03-14 NOTE — PROGRESS NOTES
Name: Terese Mac      : 2005      MRN: [id number]  Encounter Provider: AMY Vasquez  Encounter Date: 3/14/2025   Encounter department: [facility]  :  Assessment & Plan  Iron deficiency    Orders:    Ambulatory Referral to Hematology / Oncology    CBC; Future    Iron Panel (Includes Ferritin, Iron Sat%, Iron, and TIBC); Future    Ambulatory referral to Gastroenterology; Future    Decreased appetite    Orders:    Ambulatory referral to Gastroenterology; Future    Constipation, unspecified constipation type    Orders:    Ambulatory referral to Gastroenterology; Future    19-year-old female with iron deficiency.  While her serum iron and iron saturation are stable, her ferritin is only 5.  There is no clear etiology of her iron deficiency therefore, recommend light evaluation with gastroenterology to rule out any GI etiologies.    Due to her constipation, patient will likely benefit from IV iron treatment, Venofer 200 mg weekly x 4 doses to be run over 1.5 hours.  Patient educated about the iron product, administration and common adverse effects including but not limited to: Anaphylaxis/allergic reaction, arthralgias/myalgias, nausea; agrees to proceed with treatment.       I will see patient back in the office in 4 months with labs prior (CBCD, iron panel).  Patient is agreeable with the plan.  She is aware to contact us for any additional questions/concerns or worsening symptoms.    Return in about 4 months (around 2025) for Schedule Iron Infusions, PRINT AVS.    History of Present Illness   Chief Complaint   Patient presents with    Consult       Pertinent Medical History     25: Terese Mac is a 19-year-old female who presents for initial consultation of her iron deficiency.  She is here with her mother today.    Patient reports she had an IUD placed last year and she bled every single day having to change her pad/tampon every 4 hours  "for 1 full year.  About 2 months she started getting a monthly menstrual cycle for the last 7 days with 4 days on the heavier side having to change her pad/tampon every 2-3 hours.    Patient did trial oral iron supplements however, she did not tolerate them.  She eats about 1 meal a day and is experiencing nausea.  She has chronic constipation and has a BM every 3 days.    Patient endorses fatigue, dizziness on ambulation, increased frequency of headaches.  Denies any RLS, PICA, CP, SOB, palpitations.  Patient denies abnormal bleeding: epistaxis, gingival bleeding, hematuria, dark tarry stools.    Patient is not a vegetarian/vegan.      Labs: Vitamin B12 402, serum iron 117, iron saturation 35%, ferritin 5      Family history:  Mother = breast cancer, anemia and menorrhagia  Renal great-grandmother = uterine cancer       Review of Systems   Constitutional:  Positive for appetite change and fatigue. Negative for activity change, diaphoresis, fever and unexpected weight change.   HENT:  Negative for nosebleeds.    Eyes: Negative.    Respiratory:  Negative for cough, chest tightness and shortness of breath.    Cardiovascular:  Negative for chest pain, palpitations and leg swelling.   Gastrointestinal:  Positive for constipation and nausea. Negative for abdominal pain and blood in stool.   Endocrine: Negative.    Genitourinary:  Negative for hematuria.   Musculoskeletal: Negative.    Skin: Negative.    Neurological:  Positive for dizziness and headaches. Negative for light-headedness.   Hematological: Negative.            Objective   /70 (BP Location: Left arm, Patient Position: Sitting, Cuff Size: Adult)   Pulse 77   Temp 97.9 °F (36.6 °C) (Temporal)   Resp 18   Ht 5' 1.5\" (1.562 m)   Wt 56.7 kg (125 lb)   SpO2 99%   BMI 23.24 kg/m²     Pain Screening:  Pain Score: 0-No pain  ECOG   0  Physical Exam  Constitutional:       Appearance: Normal appearance.   HENT:      Head: Normocephalic and atraumatic. " "  Cardiovascular:      Rate and Rhythm: Regular rhythm.      Heart sounds: Normal heart sounds.   Pulmonary:      Breath sounds: Normal breath sounds.   Musculoskeletal:      Cervical back: Normal range of motion.      Right lower leg: No edema.      Left lower leg: No edema.   Skin:     General: Skin is warm and dry.   Neurological:      Mental Status: She is alert and oriented to person, place, and time.   Psychiatric:         Mood and Affect: Mood normal.         Behavior: Behavior normal.         Thought Content: Thought content normal.         Judgment: Judgment normal.         Labs: I have reviewed the following labs:  No results found for: \"WBC\", \"RBC\", \"HGB\", \"HCT\", \"MCV\", \"MCH\", \"RDW\", \"PLT\", \"NEUTOPHILPCT\", \"LYMPHOPCT\", \"MONOPCT\", \"EOSPCT\", \"BASOPCT\", \"IMMGRANS\", \"NEUTROABS\"  No results found for: \"NA\", \"K\", \"CL\", \"CO2\", \"ANIONGAP\", \"BUN\", \"CREATININE\", \"GLUCOSE\", \"GLUF\", \"CALCIUM\", \"CORRECTEDCA\", \"AST\", \"ALT\", \"ALKPHOS\", \"TP\", \"ALB\", \"TBILI\", \"EGFR\"  I spent 40 minutes in chart review, counseling, coordination of care, and documentation.    This note has been generated by voice recognition software system.  Therefore, there may be spelling, grammar, and or syntax errors. Please contact if questions arise.      "

## 2025-03-22 ENCOUNTER — APPOINTMENT (OUTPATIENT)
Dept: LAB | Facility: CLINIC | Age: 20
End: 2025-03-22
Payer: COMMERCIAL

## 2025-03-22 DIAGNOSIS — E61.1 IRON DEFICIENCY: ICD-10-CM

## 2025-03-22 LAB
FERRITIN SERPL-MCNC: 5 NG/ML (ref 11–307)
IRON SATN MFR SERPL: 31 % (ref 15–50)
IRON SERPL-MCNC: 100 UG/DL (ref 50–212)
TIBC SERPL-MCNC: 326.2 UG/DL (ref 250–450)
TRANSFERRIN SERPL-MCNC: 233 MG/DL (ref 203–362)
UIBC SERPL-MCNC: 226 UG/DL (ref 155–355)

## 2025-03-22 PROCEDURE — 83540 ASSAY OF IRON: CPT

## 2025-03-22 PROCEDURE — 83550 IRON BINDING TEST: CPT

## 2025-03-22 PROCEDURE — 36415 COLL VENOUS BLD VENIPUNCTURE: CPT

## 2025-03-22 PROCEDURE — 82728 ASSAY OF FERRITIN: CPT

## 2025-03-24 ENCOUNTER — RESULTS FOLLOW-UP (OUTPATIENT)
Dept: HEMATOLOGY ONCOLOGY | Facility: CLINIC | Age: 20
End: 2025-03-24

## 2025-03-26 ENCOUNTER — TELEMEDICINE (OUTPATIENT)
Dept: FAMILY MEDICINE CLINIC | Facility: CLINIC | Age: 20
End: 2025-03-26
Payer: COMMERCIAL

## 2025-03-26 DIAGNOSIS — R61 EXCESSIVE SWEATING: Primary | ICD-10-CM

## 2025-03-26 DIAGNOSIS — E61.1 IRON DEFICIENCY: ICD-10-CM

## 2025-03-26 DIAGNOSIS — E23.6 RATHKE'S CLEFT CYST (HCC): ICD-10-CM

## 2025-03-26 PROCEDURE — 99214 OFFICE O/P EST MOD 30 MIN: CPT | Performed by: FAMILY MEDICINE

## 2025-03-26 RX ORDER — ALUMINUM CHLORIDE 20 %
SOLUTION, NON-ORAL TOPICAL
Qty: 35 ML | Refills: 0 | Status: SHIPPED | OUTPATIENT
Start: 2025-03-26

## 2025-03-26 NOTE — PROGRESS NOTES
Virtual Regular VisitName: Terese Mac      : 2005      MRN: [id number]  Encounter Provider: Bharati Ashby MD  Encounter Date: 3/26/2025   Encounter department: [facility]  :  Assessment & Plan  Excessive sweating  New diagnosis symptomatic mostly in the axillary area recommend to use Drysol proper use and possible side effect review I discussed with the patient if no improvement if SHEENT interested in Botox injection to be evaluated by dermatology  Orders:  •  aluminum chloride (Drysol) 20 % external solution; Apply topically daily at bedtime    Iron deficiency  History of iron deficiency anemia recent blood work iron level normal range       Rathke's cleft cyst (HCC)  Patient already had MRI previously evaluated by endocrinology recently who ordered blood work workup and no indication to repeat the MRI              History of Present Illness     Patient virtual visit follow-up with a chronic condition and she is concerned about excessive sweating and axillary area she been tried different kind of antiperspirant over-the-counter and does not help and she constant change her clothes and at affecting her socially since been seen last time evaluated by endocrinology note reviewed with the patient recent blood work review      Review of Systems   Constitutional:  Negative for chills and fever.   HENT:  Negative for ear pain and sore throat.    Eyes:  Negative for pain and visual disturbance.   Respiratory:  Negative for cough and shortness of breath.    Cardiovascular:  Negative for chest pain and palpitations.   Gastrointestinal:  Negative for abdominal pain, constipation, diarrhea and vomiting.   Endocrine:        Excessive sweating  axillary area   Genitourinary:  Negative for dysuria and hematuria.   Musculoskeletal:  Negative for arthralgias and back pain.   Skin:  Negative for color change and rash.   Neurological:  Negative for seizures and syncope.   All  other systems reviewed and are negative.      Objective   There were no vitals taken for this visit.    Physical Exam  Constitutional:       General: She is not in acute distress.     Appearance: She is well-developed. She is not ill-appearing, toxic-appearing or diaphoretic.   HENT:      Head: Normocephalic.      Nose: Nose normal. No congestion or rhinorrhea.      Mouth/Throat:      Mouth: Mucous membranes are moist.      Pharynx: No oropharyngeal exudate or posterior oropharyngeal erythema.   Eyes:      General:         Right eye: No discharge.         Left eye: No discharge.      Conjunctiva/sclera: Conjunctivae normal.   Neck:      Vascular: No JVD.      Comments: No grossly visible mass   Pulmonary:      Effort: Pulmonary effort is normal. No respiratory distress.      Breath sounds: No stridor. No wheezing.   Abdominal:      General: There is no distension.      Comments: Patient state her abdomen and a soft she also state no tender  Patient deny any visible or palpable bulging to suggest hernia  I was able to visualize abdomen and there is no change in the color no distension no visible mass   Musculoskeletal:         General: Normal range of motion.      Cervical back: Normal range of motion and neck supple.   Skin:     Findings: No erythema or rash.   Neurological:      Mental Status: She is alert and oriented to person, place, and time.   Psychiatric:         Mood and Affect: Mood normal.         Administrative Statements   Encounter provider Bharati Ashby MD    The Patient is located at Home and in the following state in which I hold an active license [place].    The patient was identified by name and date of birth. Terese CHAND Bass Mac was informed that this is a telemedicine visit and that the visit is being conducted through the Epic Embedded platform. She agrees to proceed..  My office door was closed. No one else was in the room.  She acknowledged consent and understanding of privacy and security of the  video platform. The patient has agreed to participate and understands they can discontinue the visit at any time.    I have spent a total time of 15 minutes in caring for this patient on the day of the visit/encounter including Diagnostic results, Instructions for management, Importance of tx compliance, and Impressions, not including the time spent for establishing the audio/video connection.

## 2025-03-27 PROBLEM — R61 EXCESSIVE SWEATING: Status: ACTIVE | Noted: 2025-03-27

## 2025-03-27 NOTE — ASSESSMENT & PLAN NOTE
[address] [place] [address] SUMMARY      Patient ID:  Dorothy Romaon  46 y.o. 1967    Admit date: 10/3/2018    Discharge date: 10/4/2018     Admitting Physician: Segun Calloway MD     Discharge MD: Segun Calloway     Admission Diagnoses: NSTEMI (non-ST elevated myocardial infarction) [facility]) [I21.4]    Discharge Diagnoses:   Patient Active Problem List   Diagnosis    Diabetes mellitus out of control ([facility]    Chest pain    Left arm numbness    Essential hypertension    Elbow contusion    CAD (coronary artery disease)    HLD (hyperlipidemia)    Abnormal stress test    DMII (diabetes mellitus, type 2) (HCC)    Acute metabolic encephalopathy    POTS (postural orthostatic tachycardia syndrome)    Hyperglycemia    Headache    Skin ulcer of left foot with fat layer exposed ([facility]    Diabetic peripheral neuropathy ([facility])    Abscess of arm, left    Ulcer of foot due to secondary diabetes ([facility]    NSTEMI (non-ST elevated myocardial infarction) [facility])        Discharged Condition: fair    Hospital Course: Dorothy Romano was admitted for STEMI      At discharge,     Consults: None    The patient was seen and examined. Notes reviewed. There were not complications over night. The patient is being seen for ischemic heart disease.     Objective:     /68   Pulse 92   Temp 98.7 °F (37.1 °C) (Oral)   Resp 20   Ht 6' 1\" (1.854 m)   Wt 198 lb 13.7 oz (90.2 kg)   SpO2 96%   BMI 26.24 kg/m²    No intake or output data in the 24 hours ending 10/21/18 1850    Physical Exam:  General:  Awake, alert, NAD  Skin:  Warm and dry  Neck:  JVD<8, no bruit  Chest:  Clear to auscultation, no wheezes/rhonchi/rales  Cardiovascular:  RRR S1S2  Abdomen:  Soft, nontender, +bowel sounds  Extremities:  no edema    Labs:   Lab Results   Component Value Date    CREATININE 0.7 (L) 10/04/2018    BUN 12 10/04/2018     (L) 10/04/2018    K 3.7 10/04/2018    CL 94 (L) 10/04/2018    CO2 26 10/04/2018      Lab History of iron deficiency anemia recent blood work iron level normal range

## 2025-03-27 NOTE — ASSESSMENT & PLAN NOTE
New diagnosis symptomatic mostly in the axillary area recommend to use Drysol proper use and possible side effect review I discussed with the patient if no improvement if SHEENT interested in Botox injection to be evaluated by dermatology  Orders:  •  aluminum chloride (Drysol) 20 % external solution; Apply topically daily at bedtime

## 2025-03-27 NOTE — ASSESSMENT & PLAN NOTE
Patient already had MRI previously evaluated by endocrinology recently who ordered blood work workup and no indication to repeat the MRI

## 2025-03-31 ENCOUNTER — PATIENT MESSAGE (OUTPATIENT)
Dept: FAMILY MEDICINE CLINIC | Facility: CLINIC | Age: 20
End: 2025-03-31

## 2025-04-01 NOTE — PATIENT COMMUNICATION
Patient called and stated the Drysol 20% is too strong and causes burning. She wanted to know if it could be switched to the 12% instead.

## 2025-04-02 ENCOUNTER — TELEPHONE (OUTPATIENT)
Dept: INFUSION CENTER | Facility: CLINIC | Age: 20
End: 2025-04-02

## 2025-04-02 DIAGNOSIS — E61.1 IRON DEFICIENCY: Primary | ICD-10-CM

## 2025-04-02 RX ORDER — SODIUM CHLORIDE 9 MG/ML
20 INJECTION, SOLUTION INTRAVENOUS ONCE
Status: CANCELLED | OUTPATIENT
Start: 2025-04-04

## 2025-04-02 NOTE — TELEPHONE ENCOUNTER
Patient called back to complete new patient phone call. Patient confirms date, time, location, visitor policy. Patient denies any further questions at this time.

## 2025-04-02 NOTE — TELEPHONE ENCOUNTER
Called and left voicemail to confirm apt for 4/4/25 @3pm @ [place]. Left call back number if patient needs to call back with any questions.

## 2025-04-03 NOTE — PATIENT COMMUNICATION
Patient called regarding her request for 12% Drysol to be sent to her pharmacy. Please follow up with patient.

## 2025-04-04 ENCOUNTER — HOSPITAL ENCOUNTER (OUTPATIENT)
Dept: INFUSION CENTER | Facility: CLINIC | Age: 20
End: 2025-04-04
Payer: COMMERCIAL

## 2025-04-04 ENCOUNTER — TELEPHONE (OUTPATIENT)
Age: 20
End: 2025-04-04

## 2025-04-04 VITALS
TEMPERATURE: 97.2 F | HEART RATE: 77 BPM | DIASTOLIC BLOOD PRESSURE: 77 MMHG | SYSTOLIC BLOOD PRESSURE: 109 MMHG | OXYGEN SATURATION: 100 % | RESPIRATION RATE: 18 BRPM

## 2025-04-04 DIAGNOSIS — E61.1 IRON DEFICIENCY: Primary | ICD-10-CM

## 2025-04-04 PROCEDURE — 96365 THER/PROPH/DIAG IV INF INIT: CPT

## 2025-04-04 RX ORDER — SODIUM CHLORIDE 9 MG/ML
20 INJECTION, SOLUTION INTRAVENOUS ONCE
Status: CANCELLED | OUTPATIENT
Start: 2025-04-11

## 2025-04-04 RX ORDER — SODIUM CHLORIDE 9 MG/ML
20 INJECTION, SOLUTION INTRAVENOUS ONCE
Status: COMPLETED | OUTPATIENT
Start: 2025-04-04 | End: 2025-04-04

## 2025-04-04 RX ADMIN — SODIUM CHLORIDE 20 ML/HR: 9 INJECTION, SOLUTION INTRAVENOUS at 15:20

## 2025-04-04 RX ADMIN — IRON SUCROSE 200 MG: 20 INJECTION, SOLUTION INTRAVENOUS at 15:20

## 2025-04-04 NOTE — TELEPHONE ENCOUNTER
Patient calling regarding her Drysol Script and asking for this to be decreased to 12%. Please advise.

## 2025-04-04 NOTE — PROGRESS NOTES
Patient tolerated first Venofer today without any S/S hypersensitivity reaction. PIV removed intact by Sandi RODRIGUEZ RN, coban wrap in place. Patient aware of next appt at [facility] infusion 4/11 at 3PM, declines AVS.

## 2025-04-07 DIAGNOSIS — R39.9 UTI SYMPTOMS: Primary | ICD-10-CM

## 2025-04-07 DIAGNOSIS — N89.8 VAGINAL DISCHARGE: Primary | ICD-10-CM

## 2025-04-07 RX ORDER — DOXYCYCLINE 100 MG/1
100 TABLET ORAL 2 TIMES DAILY
Qty: 14 TABLET | Refills: 0 | Status: SHIPPED | OUTPATIENT
Start: 2025-04-07 | End: 2025-04-14

## 2025-04-08 ENCOUNTER — TELEPHONE (OUTPATIENT)
Dept: INFUSION CENTER | Facility: CLINIC | Age: 20
End: 2025-04-08

## 2025-04-08 DIAGNOSIS — R30.0 DYSURIA: Primary | ICD-10-CM

## 2025-04-08 RX ORDER — NITROFURANTOIN 25; 75 MG/1; MG/1
100 CAPSULE ORAL 2 TIMES DAILY
Qty: 14 CAPSULE | Refills: 0 | Status: SHIPPED | OUTPATIENT
Start: 2025-04-08 | End: 2025-04-15

## 2025-04-08 RX ORDER — PHENAZOPYRIDINE HYDROCHLORIDE 100 MG/1
100 TABLET, FILM COATED ORAL 3 TIMES DAILY PRN
Qty: 6 TABLET | Refills: 0 | Status: SHIPPED | OUTPATIENT
Start: 2025-04-08 | End: 2025-04-10

## 2025-04-08 NOTE — TELEPHONE ENCOUNTER
Patient called in to see if her timing could be moved to later appt - patient aware unfortunately I am unable to move any later due to her appt timing. Patient aware, will keep appointment but will call us with any issues.

## 2025-04-11 ENCOUNTER — HOSPITAL ENCOUNTER (OUTPATIENT)
Dept: INFUSION CENTER | Facility: CLINIC | Age: 20
End: 2025-04-11
Payer: COMMERCIAL

## 2025-04-11 VITALS
DIASTOLIC BLOOD PRESSURE: 60 MMHG | OXYGEN SATURATION: 100 % | SYSTOLIC BLOOD PRESSURE: 92 MMHG | HEART RATE: 72 BPM | RESPIRATION RATE: 18 BRPM | TEMPERATURE: 98 F

## 2025-04-11 DIAGNOSIS — E61.1 IRON DEFICIENCY: Primary | ICD-10-CM

## 2025-04-11 PROCEDURE — 96366 THER/PROPH/DIAG IV INF ADDON: CPT

## 2025-04-11 PROCEDURE — 96365 THER/PROPH/DIAG IV INF INIT: CPT

## 2025-04-11 RX ORDER — SODIUM CHLORIDE 9 MG/ML
20 INJECTION, SOLUTION INTRAVENOUS ONCE
Status: CANCELLED | OUTPATIENT
Start: 2025-04-18

## 2025-04-11 RX ORDER — SODIUM CHLORIDE 9 MG/ML
20 INJECTION, SOLUTION INTRAVENOUS ONCE
Status: COMPLETED | OUTPATIENT
Start: 2025-04-11 | End: 2025-04-11

## 2025-04-11 RX ADMIN — IRON SUCROSE 200 MG: 20 INJECTION, SOLUTION INTRAVENOUS at 15:24

## 2025-04-11 RX ADMIN — SODIUM CHLORIDE 20 ML/HR: 0.9 INJECTION, SOLUTION INTRAVENOUS at 15:23

## 2025-04-11 NOTE — PROGRESS NOTES
Patient here for venofer, offers no complaints. Tolerated infusion without incident. Next appointment confirmed for 4/18 at 1500, declined AVS.

## 2025-04-12 ENCOUNTER — APPOINTMENT (EMERGENCY)
Dept: RADIOLOGY | Facility: HOSPITAL | Age: 20
End: 2025-04-12
Payer: COMMERCIAL

## 2025-04-12 ENCOUNTER — HOSPITAL ENCOUNTER (EMERGENCY)
Facility: HOSPITAL | Age: 20
Discharge: HOME | End: 2025-04-12
Attending: EMERGENCY MEDICINE
Payer: COMMERCIAL

## 2025-04-12 VITALS
RESPIRATION RATE: 18 BRPM | DIASTOLIC BLOOD PRESSURE: 60 MMHG | HEART RATE: 122 BPM | TEMPERATURE: 98.3 F | OXYGEN SATURATION: 99 % | SYSTOLIC BLOOD PRESSURE: 125 MMHG

## 2025-04-12 DIAGNOSIS — G43.909 MIGRAINE: Primary | ICD-10-CM

## 2025-04-12 LAB
ATRIAL RATE: 125 BPM
P AXIS: 68 DEGREES
PR INTERVAL: 126 MS
QRS AXIS: 77 DEGREES
QRSD INTERVAL: 72 MS
QT INTERVAL: 300 MS
QTC INTERVAL: 433 MS
T WAVE AXIS: 30 DEGREES
VENTRICULAR RATE: 125 BPM

## 2025-04-12 PROCEDURE — 96375 TX/PRO/DX INJ NEW DRUG ADDON: CPT

## 2025-04-12 PROCEDURE — 70450 CT HEAD/BRAIN W/O DYE: CPT

## 2025-04-12 PROCEDURE — 96365 THER/PROPH/DIAG IV INF INIT: CPT

## 2025-04-12 PROCEDURE — 96372 THER/PROPH/DIAG INJ SC/IM: CPT

## 2025-04-12 PROCEDURE — 93010 ELECTROCARDIOGRAM REPORT: CPT | Performed by: INTERNAL MEDICINE

## 2025-04-12 PROCEDURE — 99284 EMERGENCY DEPT VISIT MOD MDM: CPT | Performed by: EMERGENCY MEDICINE

## 2025-04-12 PROCEDURE — 99284 EMERGENCY DEPT VISIT MOD MDM: CPT

## 2025-04-12 PROCEDURE — 93005 ELECTROCARDIOGRAM TRACING: CPT

## 2025-04-12 PROCEDURE — 96361 HYDRATE IV INFUSION ADD-ON: CPT

## 2025-04-12 RX ORDER — MAGNESIUM SULFATE HEPTAHYDRATE 40 MG/ML
2 INJECTION, SOLUTION INTRAVENOUS EVERY 24 HOURS
Status: DISCONTINUED | OUTPATIENT
Start: 2025-04-12 | End: 2025-04-12

## 2025-04-12 RX ORDER — DIHYDROERGOTAMINE MESYLATE 1 MG/ML
1 INJECTION, SOLUTION INTRAMUSCULAR; INTRAVENOUS; SUBCUTANEOUS ONCE
Status: DISCONTINUED | OUTPATIENT
Start: 2025-04-12 | End: 2025-04-12

## 2025-04-12 RX ORDER — MAGNESIUM SULFATE HEPTAHYDRATE 40 MG/ML
2 INJECTION, SOLUTION INTRAVENOUS ONCE
Status: COMPLETED | OUTPATIENT
Start: 2025-04-12 | End: 2025-04-12

## 2025-04-12 RX ORDER — DIPHENHYDRAMINE HYDROCHLORIDE 50 MG/ML
25 INJECTION, SOLUTION INTRAMUSCULAR; INTRAVENOUS EVERY 8 HOURS SCHEDULED
Status: DISCONTINUED | OUTPATIENT
Start: 2025-04-12 | End: 2025-04-12

## 2025-04-12 RX ORDER — SUMATRIPTAN 6 MG/.5ML
6 INJECTION, SOLUTION SUBCUTANEOUS ONCE
Status: COMPLETED | OUTPATIENT
Start: 2025-04-12 | End: 2025-04-12

## 2025-04-12 RX ORDER — KETOROLAC TROMETHAMINE 30 MG/ML
30 INJECTION, SOLUTION INTRAMUSCULAR; INTRAVENOUS EVERY 8 HOURS SCHEDULED
Status: DISCONTINUED | OUTPATIENT
Start: 2025-04-12 | End: 2025-04-12

## 2025-04-12 RX ORDER — METOCLOPRAMIDE HYDROCHLORIDE 5 MG/ML
10 INJECTION INTRAMUSCULAR; INTRAVENOUS ONCE
Status: COMPLETED | OUTPATIENT
Start: 2025-04-12 | End: 2025-04-12

## 2025-04-12 RX ORDER — DIPHENHYDRAMINE HYDROCHLORIDE 50 MG/ML
25 INJECTION, SOLUTION INTRAMUSCULAR; INTRAVENOUS ONCE
Status: COMPLETED | OUTPATIENT
Start: 2025-04-12 | End: 2025-04-12

## 2025-04-12 RX ORDER — METOCLOPRAMIDE HYDROCHLORIDE 5 MG/ML
10 INJECTION INTRAMUSCULAR; INTRAVENOUS EVERY 8 HOURS SCHEDULED
Status: DISCONTINUED | OUTPATIENT
Start: 2025-04-12 | End: 2025-04-12

## 2025-04-12 RX ORDER — SODIUM CHLORIDE 9 MG/ML
100 INJECTION, SOLUTION INTRAVENOUS ONCE
Status: DISCONTINUED | OUTPATIENT
Start: 2025-04-12 | End: 2025-04-12

## 2025-04-12 RX ORDER — KETOROLAC TROMETHAMINE 30 MG/ML
30 INJECTION, SOLUTION INTRAMUSCULAR; INTRAVENOUS ONCE
Status: COMPLETED | OUTPATIENT
Start: 2025-04-12 | End: 2025-04-12

## 2025-04-12 RX ADMIN — SUMATRIPTAN 6 MG: 6 INJECTION SUBCUTANEOUS at 08:36

## 2025-04-12 RX ADMIN — MAGNESIUM SULFATE HEPTAHYDRATE 2 G: 40 INJECTION, SOLUTION INTRAVENOUS at 09:06

## 2025-04-12 RX ADMIN — DIPHENHYDRAMINE HYDROCHLORIDE 25 MG: 50 INJECTION, SOLUTION INTRAMUSCULAR; INTRAVENOUS at 08:39

## 2025-04-12 RX ADMIN — SODIUM CHLORIDE 1000 ML: 0.9 INJECTION, SOLUTION INTRAVENOUS at 08:34

## 2025-04-12 RX ADMIN — KETOROLAC TROMETHAMINE 30 MG: 30 INJECTION, SOLUTION INTRAMUSCULAR; INTRAVENOUS at 08:48

## 2025-04-12 RX ADMIN — METOCLOPRAMIDE 10 MG: 5 INJECTION, SOLUTION INTRAMUSCULAR; INTRAVENOUS at 08:35

## 2025-04-12 NOTE — ED ATTENDING ATTESTATION
4/12/2025  I, Audi Mcfarlane MD, saw and evaluated the patient. I have discussed the patient with the resident/non-physician practitioner and agree with the resident's/non-physician practitioner's findings, Plan of Care, and MDM as documented in the resident's/non-physician practitioner's note, except where noted. All available labs and Radiology studies were reviewed.  I was present for key portions of any procedure(s) performed by the resident/non-physician practitioner and I was immediately available to provide assistance.       At this point I agree with the current assessment done in the Emergency Department.  I have conducted an independent evaluation of this patient a history and physical is as follows:    ED Course         Critical Care Time  Procedures    20 yo female with hx of migraines, pituatary cyst, having headache for the last three days similar to previous headaches.  Pt tried otc meds with no relief. Pt with bilateral headache radiating around head, nausea.  Pt on abx for uti. No current vaginal bleeding or urinary symptoms, no n/v/d, no fever, no neck pain.  Vss, afebrile, lungs cta, rrr, abdomen soft nontender, no neuro deficits. Pain meds, ct head.

## 2025-04-12 NOTE — ED PROVIDER NOTES
Time reflects when diagnosis was documented in both MDM as applicable and the Disposition within this note       Time User Action Codes Description Comment    4/12/2025  9:48 AM Ernesto Lowe Add [G43.909] Migraine           ED Disposition       ED Disposition   Discharge    Condition   Stable    Date/Time   Sat Apr 12, 2025  9:48 AM    Comment   Terese Mac discharge to home/self care.                   Assessment & Plan       Medical Decision Making  19-year-old female with history of migraines and Rathke's cleft cyst presents to the emergency department today for evaluation of migraine for 3 days.  On examination she has mild photophobia.  Nausea with some faint epigastric discomfort/tenderness to palpation.  She is hemodynamically stable.  Sinus tachycardia noted on EKG with rate of 125 with no ST elevations or depressions or other findings indicative of ACS.  No presence of pathologic dysrhythmia.  Patient will be treated with a migraine cocktail.  Given her history of Rathke's cleft cyst, CT of the head will be ordered to confirm stability.  CT of the head was stable with no acute changes from baseline scan obtained in 2016.  After migraine cocktail, patient reports symptoms are much improved and would like to be discharged at this time.  I advised her to follow-up with her neurologist and/or PCP, as I believe she may benefit from refill of her outpatient medications.  We discussed worrisome symptoms which would require her to come back to department to which she verbalized understanding.  She remained hemodynamically stable under my care and is appropriate for discharge home with outpatient follow-up instructions and strict emergency department return precautions.    Amount and/or Complexity of Data Reviewed  Radiology: ordered.    Risk  Prescription drug management.             Medications   ketorolac (TORADOL) injection 30 mg (30 mg Intravenous Given 4/12/25 0848)   metoclopramide (REGLAN) injection  10 mg (10 mg Intravenous Given 4/12/25 0835)   diphenhydrAMINE (BENADRYL) injection 25 mg (25 mg Intravenous Given 4/12/25 0839)   SUMAtriptan (IMITREX) subcutaneous injection 6 mg (6 mg Subcutaneous Given 4/12/25 0836)   magnesium sulfate 2 g/50 mL IVPB (premix) 2 g (0 g Intravenous Stopped 4/12/25 0946)   sodium chloride 0.9 % bolus 1,000 mL (0 mL Intravenous Stopped 4/12/25 0950)       ED Risk Strat Scores                    No data recorded        SBIRT 20yo+      Flowsheet Row Most Recent Value   Initial Alcohol Screen: US AUDIT-C     1. How often do you have a drink containing alcohol? 0 Filed at: 04/12/2025 0748   2. How many drinks containing alcohol do you have on a typical day you are drinking?  0 Filed at: 04/12/2025 0748   3a. Male UNDER 65: How often do you have five or more drinks on one occasion? 0 Filed at: 04/12/2025 0748   3b. FEMALE Any Age, or MALE 65+: How often do you have 4 or more drinks on one occassion? 0 Filed at: 04/12/2025 0748   Audit-C Score 0 Filed at: 04/12/2025 0748   JASBIR: How many times in the past year have you...    Used an illegal drug or used a prescription medication for non-medical reasons? Never Filed at: 04/12/2025 0748                            History of Present Illness       Chief Complaint   Patient presents with    Headache - Recurrent or Known Dx Migraines     Pt states she has had a migraine and vomiting for 3 days.  Pt states she has not taking anything OTC for the HA today.  Pt states she was put on abx on Monday for a UTI but never had her urine tested for UTI.  Pt states they started abx based on her symptoms.       Past Medical History:   Diagnosis Date    Anxiety     Congenital jaw deformity     surgically corrected 2020    Depression     Migraine     Pituitary tumor     Scoliosis     never req'd intervention      Past Surgical History:   Procedure Laterality Date    GENIOPLASTY Bilateral 9/8/2020    Procedure: GENOPLASTY, BILATERAL SAGGITTAL SPLIT OSTEOTOMY;   Surgeon: Choco De Leon DMD;  Location: [facility];  Service: Maxillofacial    MAXILLARY LE FORTE OSTEOTOMY Bilateral 9/8/2020    Procedure: OSTEOTOMY MAXILLARY LEFORTE I;  Surgeon: Choco De Leon DMD;  Location: [facility];  Service: Maxillofacial    MULTIPLE TOOTH EXTRACTIONS N/A 9/8/2020    Procedure: EXTRACTION TEETH IMPACTED, 1, 16, 17 ,32;  Surgeon: Choco De Leon DMD;  Location: [facility];  Service: Maxillofacial    TONSILLECTOMY  2009      Family History   Problem Relation Age of Onset    Breast cancer Mother 27    Seizures Mother     Migraines Mother     No Known Problems Father     No Known Problems Brother     No Known Problems Brother     Thyroid disease Maternal Grandmother     Diabetes Maternal Grandmother     Hypertension Maternal Grandmother     Thyroid disease Paternal Aunt     Diabetes Paternal Uncle     Colon cancer Neg Hx     Ovarian cancer Neg Hx       Social History     Tobacco Use    Smoking status: Never     Passive exposure: Never    Smokeless tobacco: Never   Vaping Use    Vaping status: Never Used   Substance Use Topics    Alcohol use: Yes     Comment: couple times/month    Drug use: Never      E-Cigarette/Vaping    E-Cigarette Use Never User       E-Cigarette/Vaping Substances    Nicotine No     THC No     CBD No     Flavoring No     Other No     Unknown No       I have reviewed and agree with the history as documented.     19-year-old female with history of migraine without aura, and Rathke's cleft cyst, presents to the emergency department today for migraine lasting greater than 72 hours.  Patient tells me the headache began 3 to 4 days ago.  She feels it in the forehead bilaterally and wraps around the sides of her head.  She also describes some sensation of muscle tightness in the back of her head and her neck.  Also feeling nauseated.  Acknowledges new photophobia.  Otherwise, she tells me the headache feels like her usual migraine, just lasting a longer than normal.  She  says they usually last an hour or 2 and then resolve.  No change in character per her account.  She was prescribed medication in the past but has not taken it.  Of note, she notes that she is currently being treated for a UTI        Review of Systems   Constitutional:  Negative for activity change, chills, fatigue and fever.   HENT:  Negative for congestion, sinus pressure, sinus pain and tinnitus.    Respiratory:  Negative for shortness of breath.    Cardiovascular:  Negative for chest pain.   Gastrointestinal:  Positive for nausea and vomiting. Negative for abdominal pain and diarrhea.   Genitourinary:  Positive for dysuria.   Musculoskeletal:  Positive for neck pain. Negative for neck stiffness.   Skin:  Negative for rash and wound.   Neurological:  Positive for light-headedness and headaches. Negative for dizziness, tremors, seizures, syncope, speech difficulty, weakness and numbness.   Psychiatric/Behavioral:  Negative for agitation and confusion.            Objective       ED Triage Vitals [04/12/25 0744]   Temperature Pulse Blood Pressure Respirations SpO2 Patient Position - Orthostatic VS   98.3 °F (36.8 °C) (!) 122 125/60 18 99 % Sitting      Temp Source Heart Rate Source BP Location FiO2 (%) Pain Score    Temporal Monitor Left arm -- 6      Vitals      Date and Time Temp Pulse SpO2 Resp BP Pain Score FACES Pain Rating User   04/12/25 0744 98.3 °F (36.8 °C) 122 99 % 18 125/60 6 -- KRR            Physical Exam  Constitutional:       General: She is not in acute distress.     Appearance: Normal appearance. She is not ill-appearing.   HENT:      Head: Normocephalic and atraumatic.      Nose: Nose normal.   Eyes:      Extraocular Movements: Extraocular movements intact.      Pupils: Pupils are equal, round, and reactive to light.   Cardiovascular:      Rate and Rhythm: Normal rate and regular rhythm.      Pulses: Normal pulses.      Heart sounds: Normal heart sounds. No murmur heard.     No friction rub.    Pulmonary:      Effort: Pulmonary effort is normal. No respiratory distress.      Breath sounds: Normal breath sounds.   Abdominal:      General: Abdomen is flat. There is no distension.      Palpations: Abdomen is soft.      Tenderness: There is no guarding.      Comments: Mild epigastric TTP   Musculoskeletal:      Cervical back: Normal range of motion. No rigidity or tenderness.   Skin:     General: Skin is warm and dry.      Findings: No rash.   Neurological:      General: No focal deficit present.      Mental Status: She is alert and oriented to person, place, and time.      Cranial Nerves: No cranial nerve deficit.      Sensory: No sensory deficit.      Motor: No weakness.      Gait: Gait normal.   Psychiatric:         Mood and Affect: Mood normal.         Behavior: Behavior normal.         Results Reviewed       None            CT head wo contrast   Final Interpretation by Pallav N Shah, MD ([id number])      No acute intracranial abnormality.      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                  Workstation performed: [id number]             Procedures    ED Medication and Procedure Management   Prior to Admission Medications   Prescriptions Last Dose Informant Patient Reported? Taking?   Levonorgestrel (MIRENA) 20 MCG/DAY IUD  Self Yes No   Si each by Intrauterine Device route Once every 8 years   aluminum chloride (Drysol) 20 % external solution   No No   Sig: Apply topically daily at bedtime   doxycycline (ADOXA) 100 MG tablet   No No   Sig: Take 1 tablet (100 mg total) by mouth 2 (two) times a day for 7 days   nitrofurantoin (MACROBID) 100 mg capsule   No No   Sig: Take 1 capsule (100 mg total) by mouth 2 (two) times a day for 7 days      Facility-Administered Medications: None     Patient's Medications   Discharge Prescriptions    No medications on file     No discharge procedures on file.  ED SEPSIS DOCUMENTATION   Time reflects when diagnosis was documented in  both MDM as applicable and the Disposition within this note       Time User Action Codes Description Comment    4/12/2025  9:48 AM Ernesto Lowe Add [G43.909] Migraine                  Ernesto Lowe MD  04/12/25 0952

## 2025-04-18 ENCOUNTER — HOSPITAL ENCOUNTER (OUTPATIENT)
Dept: INFUSION CENTER | Facility: CLINIC | Age: 20
End: 2025-04-18
Payer: COMMERCIAL

## 2025-04-18 DIAGNOSIS — E61.1 IRON DEFICIENCY: Primary | ICD-10-CM

## 2025-04-18 PROCEDURE — 96365 THER/PROPH/DIAG IV INF INIT: CPT

## 2025-04-18 RX ORDER — SODIUM CHLORIDE 9 MG/ML
20 INJECTION, SOLUTION INTRAVENOUS ONCE
Status: COMPLETED | OUTPATIENT
Start: 2025-04-18 | End: 2025-04-18

## 2025-04-18 RX ORDER — SODIUM CHLORIDE 9 MG/ML
20 INJECTION, SOLUTION INTRAVENOUS ONCE
Status: CANCELLED | OUTPATIENT
Start: 2025-04-25

## 2025-04-18 RX ADMIN — IRON SUCROSE 200 MG: 20 INJECTION, SOLUTION INTRAVENOUS at 15:33

## 2025-04-18 RX ADMIN — SODIUM CHLORIDE 20 ML/HR: 0.9 INJECTION, SOLUTION INTRAVENOUS at 15:34

## 2025-04-18 NOTE — PROGRESS NOTES
Patient tolerated venofer infusion without incident. Peripheral IV removed. Next appointment confirmed for 4/25/2025 at 1500. AVS offered and declined.

## 2025-04-21 ENCOUNTER — TELEPHONE (OUTPATIENT)
Age: 20
End: 2025-04-21

## 2025-04-21 ENCOUNTER — TELEMEDICINE (OUTPATIENT)
Dept: FAMILY MEDICINE CLINIC | Facility: CLINIC | Age: 20
End: 2025-04-21
Payer: COMMERCIAL

## 2025-04-21 DIAGNOSIS — R61 EXCESSIVE SWEATING: Primary | ICD-10-CM

## 2025-04-21 PROCEDURE — 99213 OFFICE O/P EST LOW 20 MIN: CPT

## 2025-04-21 NOTE — PROGRESS NOTES
Virtual Regular VisitName: Terese Mac      : 2005      MRN: [id number]  Encounter Provider: Tiffani Gayle PA-C  Encounter Date: 2025   Encounter department: [facility]  :  Assessment & Plan  Excessive sweating  Acute on chronic, persistent symptomatic  Has been trying aluminum chloride 20% for the past month without relief   Hx of Rathke's cleft cyst, Did follow with endo and had repeat MRI and blood work- cyst is stable and blood work normal  -Pt is interested in botox injection but concern for cost. Will try alternative topical treatment first for 1 month, then patient will call if persistent symptoms  Orders:  •  Glycopyrronium Tosylate 2.4 % PADS; Apply 1 Pad topically daily        History of Present Illness {?Quick Links Encounters * My Last Note * Last Note in Specialty * Snapshot * Since Last Visit * History :[id number]}    Patient is here for persistent symptoms of excess sweating in the armpit area  -Patient was last evaluated in the office 1 month ago and was started on aluminum chloride 20% topical.  Patient has been using once per night but she started to notice a burning.  It was not so bad at first but it has gotten worse.  She then started applying every other night but she felt like the medicine was not helping with the sweating symptoms because she was not using it every night.  No rash no signs of infection.  She is inquiring if there is any other topical treatments or if she could go for Botox.  She states that she knows it could be expensive so she was wondering if there are other topical she could try first.  She states that she is still sweating.  And when she uses antiperspirant just regular over-the-counter deodorant she feels like it just comes off immediately and is not helping.      Review of Systems   Constitutional:  Positive for diaphoresis. Negative for chills, fatigue and fever.   Respiratory:  Negative for shortness of breath.     Cardiovascular:  Negative for chest pain.       Objective {?Quick Links Trend Vitals * Enter New Vitals * Results Review * Timeline (Adult) * Labs * Imaging * Cardiology * Procedures * Lung Cancer Screening * Surgical eConsent :[id number]}  There were no vitals taken for this visit.    Physical Exam  Constitutional:       General: She is not in acute distress.     Appearance: She is well-developed.   HENT:      Head: Normocephalic and atraumatic.   Eyes:      Conjunctiva/sclera: Conjunctivae normal.   Pulmonary:      Effort: Pulmonary effort is normal. No respiratory distress.   Abdominal:      Palpations: Abdomen is soft.      Tenderness: There is no abdominal tenderness.   Musculoskeletal:         General: No swelling.      Cervical back: Normal range of motion and neck supple.   Skin:     General: Skin is warm and dry.   Neurological:      Mental Status: She is alert.   Psychiatric:         Mood and Affect: Mood normal.         Administrative Statements   Encounter provider Tiffani Gayle PA-C    The Patient is located at Other and in the following state in which I hold an active license [place].    The patient was identified by name and date of birth. Terese Mac was informed that this is a telemedicine visit and that the visit is being conducted through the Epic Embedded platform. She agrees to proceed..  My office door was closed. No one else was in the room.  She acknowledged consent and understanding of privacy and security of the video platform. The patient has agreed to participate and understands they can discontinue the visit at any time.    I have spent a total time of 15 minutes in caring for this patient on the day of the visit/encounter including Prognosis, Risks and benefits of tx options, Instructions for management, Documenting in the medical record, Reviewing/placing orders in the medical record (including tests, medications, and/or procedures), and Obtaining or reviewing history  , not  including the time spent for establishing the audio/video connection.

## 2025-04-21 NOTE — TELEPHONE ENCOUNTER
PA for Glycopyrronium Tosylate 2.4% PADS SUBMITTED to [employer]    via    []CMM-KEY:   [x]Surescripts-Case ID # [id number]   []Availity-Auth ID # NDC #   []Faxed to plan   []Other website   []Phone call Case ID #     [x]PA sent as URGENT    All office notes, labs and other pertaining documents and studies sent. Clinical questions answered. Awaiting determination from insurance company.     Turnaround time for your insurance to make a decision on your Prior Authorization can take 7-21 business days.

## 2025-04-21 NOTE — ASSESSMENT & PLAN NOTE
Acute on chronic, persistent symptomatic  Has been trying aluminum chloride 20% for the past month without relief   Hx of Rathke's cleft cyst, Did follow with endo and had repeat MRI and blood work- cyst is stable and blood work normal  -Pt is interested in botox injection but concern for cost. Will try alternative topical treatment first for 1 month, then patient will call if persistent symptoms  Orders:  •  Glycopyrronium Tosylate 2.4 % PADS; Apply 1 Pad topically daily

## 2025-04-22 NOTE — TELEPHONE ENCOUNTER
PA for Glycopyrronium Tosylate 2.4% PADS APPROVED     Date(s) approved 04/21/2025-07/21/2025    Case #[id number]     Patient advised by          [][email or web address] Message  []Phone call   [x]LMOM  []L/M to call office as no active Communication consent on file  []Unable to leave detailed message as VM not approved on Communication consent       Pharmacy advised by    [x]Fax  []Phone call  []Secure Chat    Approval letter scanned into Media Yes

## 2025-04-25 ENCOUNTER — HOSPITAL ENCOUNTER (OUTPATIENT)
Dept: INFUSION CENTER | Facility: CLINIC | Age: 20
End: 2025-04-25
Attending: INTERNAL MEDICINE
Payer: COMMERCIAL

## 2025-04-25 VITALS
DIASTOLIC BLOOD PRESSURE: 63 MMHG | HEART RATE: 63 BPM | SYSTOLIC BLOOD PRESSURE: 100 MMHG | TEMPERATURE: 98.5 F | OXYGEN SATURATION: 99 %

## 2025-04-25 DIAGNOSIS — E61.1 IRON DEFICIENCY: Primary | ICD-10-CM

## 2025-04-25 PROCEDURE — 96365 THER/PROPH/DIAG IV INF INIT: CPT

## 2025-04-25 RX ORDER — SODIUM CHLORIDE 9 MG/ML
20 INJECTION, SOLUTION INTRAVENOUS ONCE
Status: CANCELLED | OUTPATIENT
Start: 2025-05-02

## 2025-04-25 RX ORDER — SODIUM CHLORIDE 9 MG/ML
20 INJECTION, SOLUTION INTRAVENOUS ONCE
Status: COMPLETED | OUTPATIENT
Start: 2025-04-25 | End: 2025-04-25

## 2025-04-25 RX ADMIN — SODIUM CHLORIDE 20 ML/HR: 0.9 INJECTION, SOLUTION INTRAVENOUS at 15:11

## 2025-04-25 RX ADMIN — IRON SUCROSE 200 MG: 20 INJECTION, SOLUTION INTRAVENOUS at 15:12

## 2025-04-25 NOTE — PROGRESS NOTES
Patient to infusion for venofer.  She tolerated treatment today.  She completed 4/4 infusions.  She has follow up with Melissa He in July, she declined AVS

## 2025-05-01 DIAGNOSIS — B37.31 VAGINAL CANDIDIASIS: Primary | ICD-10-CM

## 2025-05-01 RX ORDER — FLUCONAZOLE 150 MG/1
150 TABLET ORAL ONCE
Qty: 1 TABLET | Refills: 0 | Status: SHIPPED | OUTPATIENT
Start: 2025-05-01 | End: 2025-05-01

## 2025-05-13 ENCOUNTER — OFFICE VISIT (OUTPATIENT)
Dept: FAMILY MEDICINE CLINIC | Facility: CLINIC | Age: 20
End: 2025-05-13
Payer: COMMERCIAL

## 2025-05-13 VITALS
HEART RATE: 86 BPM | OXYGEN SATURATION: 97 % | WEIGHT: 120 LBS | DIASTOLIC BLOOD PRESSURE: 70 MMHG | TEMPERATURE: 98.3 F | BODY MASS INDEX: 23.56 KG/M2 | SYSTOLIC BLOOD PRESSURE: 100 MMHG | HEIGHT: 60 IN

## 2025-05-13 DIAGNOSIS — J02.9 PHARYNGITIS, UNSPECIFIED ETIOLOGY: Primary | ICD-10-CM

## 2025-05-13 LAB — S PYO AG THROAT QL: NEGATIVE

## 2025-05-13 PROCEDURE — 99213 OFFICE O/P EST LOW 20 MIN: CPT | Performed by: FAMILY MEDICINE

## 2025-05-13 PROCEDURE — 87880 STREP A ASSAY W/OPTIC: CPT | Performed by: FAMILY MEDICINE

## 2025-05-13 RX ORDER — METHYLPREDNISOLONE 4 MG/1
TABLET ORAL
Qty: 21 EACH | Refills: 0 | Status: SHIPPED | OUTPATIENT
Start: 2025-05-13

## 2025-05-13 NOTE — ASSESSMENT & PLAN NOTE
Dsicussed that this is likely viral in nature There is no fever or chills and the strep culture is negaitve This will last 10 to 14 days Prednisone will help with the pain and inflammation for symptom relief Patient to contact office with any issues   Orders:    POCT rapid ANTIGEN strepA    methylPREDNISolone 4 MG tablet therapy pack; Use as directed on package

## 2025-05-13 NOTE — PROGRESS NOTES
Name: Terese Mac      : 2005      MRN: [id number]  Encounter Provider: Aracelis Alves DO  Encounter Date: 2025   Encounter department: [facility] PRIMARY [facility]ARE  :  Assessment & Plan  Pharyngitis, unspecified etiology  Dsicussed that this is likely viral in nature There is no fever or chills and the strep culture is negaitve This will last 10 to 14 days Prednisone will help with the pain and inflammation for symptom relief Patient to contact office with any issues   Orders:    POCT rapid ANTIGEN strepA    methylPREDNISolone 4 MG tablet therapy pack; Use as directed on package        Chief Complaint   Patient presents with    Sore Throat     Has had ST for about a week.  Just using Mucinex throat drops        History of Present Illness   Patient is here with sore throat for one week She has slight cough Patient has no fever or chills She does not feel sick just sore throat No history of allergies No sick contact Culture done in office negative for strep    Sore Throat   This is a new problem. Episode onset: 1 week. The problem has been unchanged. Neither side of throat is experiencing more pain than the other. There has been no fever. The pain is at a severity of 6/10. The pain is moderate. Associated symptoms include coughing, a hoarse voice and trouble swallowing. Pertinent negatives include no abdominal pain, congestion, diarrhea, drooling, ear discharge, ear pain, headaches, plugged ear sensation, neck pain, shortness of breath, stridor, swollen glands or vomiting. She has had no exposure to strep or mono. She has tried cool liquids (throat lozenges) for the symptoms. The treatment provided no relief.     Review of Systems   HENT:  Positive for hoarse voice, sore throat and trouble swallowing. Negative for congestion, drooling, ear discharge and ear pain.    Respiratory:  Positive for cough. Negative for shortness of breath and stridor.    Gastrointestinal:  Negative for  abdominal pain, diarrhea and vomiting.   Musculoskeletal:  Negative for neck pain.   Neurological:  Negative for headaches.       Objective   /70 (BP Location: Right arm, Patient Position: Sitting, Cuff Size: Standard)   Pulse 86   Temp 98.3 °F (36.8 °C) (Temporal)   Ht 5' (1.524 m)   Wt 54.4 kg (120 lb)   LMP  (LMP Unknown)   SpO2 97%   BMI 23.44 kg/m²      Physical Exam  Vitals and nursing note reviewed.   Constitutional:       Appearance: Normal appearance.   HENT:      Head: Normocephalic.      Right Ear: Tympanic membrane and external ear normal.      Left Ear: Tympanic membrane and external ear normal.      Nose: Nose normal.      Mouth/Throat:      Mouth: Mucous membranes are moist.      Pharynx: Oropharynx is clear. No oropharyngeal exudate or posterior oropharyngeal erythema.   Eyes:      Extraocular Movements: Extraocular movements intact.      Conjunctiva/sclera: Conjunctivae normal.      Pupils: Pupils are equal, round, and reactive to light.   Cardiovascular:      Rate and Rhythm: Normal rate and regular rhythm.      Heart sounds: Normal heart sounds.   Pulmonary:      Effort: Pulmonary effort is normal.      Breath sounds: Normal breath sounds.   Musculoskeletal:      Cervical back: Neck supple.   Lymphadenopathy:      Cervical: No cervical adenopathy.   Neurological:      General: No focal deficit present.      Mental Status: She is alert and oriented to person, place, and time.   Psychiatric:         Mood and Affect: Mood normal.         Behavior: Behavior normal.

## 2025-05-14 ENCOUNTER — TELEPHONE (OUTPATIENT)
Age: 20
End: 2025-05-14

## 2025-05-14 NOTE — TELEPHONE ENCOUNTER
Patient called in regards to wanting to know if provider would be able to extend her doctors note for work. Patient stated that she was seen yesterday and her symptoms has gotten worst and has not been able to go to work today. Patient would like doctors note to be extended till today, patient would like note sent to her mychart.

## 2025-05-23 ENCOUNTER — TELEPHONE (OUTPATIENT)
Dept: NEUROLOGY | Facility: CLINIC | Age: 20
End: 2025-05-23

## 2025-05-23 NOTE — TELEPHONE ENCOUNTER
LMOM for pt to confirm their  1:30p   appt on  5/30   w/ Spicer . Reminded pt to arrive 15 mins prior to appt to check in with . Gave call back number [phone] to cancel/reschedule.

## 2025-05-27 ENCOUNTER — TELEPHONE (OUTPATIENT)
Age: 20
End: 2025-05-27

## 2025-05-27 ENCOUNTER — OFFICE VISIT (OUTPATIENT)
Dept: OBGYN CLINIC | Facility: CLINIC | Age: 20
End: 2025-05-27

## 2025-05-27 VITALS
OXYGEN SATURATION: 100 % | HEART RATE: 69 BPM | WEIGHT: 116.4 LBS | HEIGHT: 60 IN | SYSTOLIC BLOOD PRESSURE: 104 MMHG | BODY MASS INDEX: 22.85 KG/M2 | DIASTOLIC BLOOD PRESSURE: 70 MMHG

## 2025-05-27 DIAGNOSIS — N76.0 ACUTE VAGINITIS: Primary | ICD-10-CM

## 2025-05-27 DIAGNOSIS — N76.0 BV (BACTERIAL VAGINOSIS): ICD-10-CM

## 2025-05-27 DIAGNOSIS — B96.89 BV (BACTERIAL VAGINOSIS): ICD-10-CM

## 2025-05-27 LAB
SL AMB  POCT GLUCOSE, UA: NORMAL
SL AMB LEUKOCYTE ESTERASE,UA: NORMAL
SL AMB POCT BILIRUBIN,UA: NORMAL
SL AMB POCT BLOOD,UA: NORMAL
SL AMB POCT CLARITY,UA: CLEAR
SL AMB POCT COLOR,UA: YELLOW
SL AMB POCT KETONES,UA: NORMAL
SL AMB POCT NITRITE,UA: NORMAL
SL AMB POCT PH,UA: 6
SL AMB POCT SPECIFIC GRAVITY,UA: 1.02
SL AMB POCT URINE PROTEIN: NORMAL
SL AMB POCT UROBILINOGEN: NORMAL

## 2025-05-27 PROCEDURE — 81003 URINALYSIS AUTO W/O SCOPE: CPT | Performed by: OBSTETRICS & GYNECOLOGY

## 2025-05-27 PROCEDURE — 87186 SC STD MICRODIL/AGAR DIL: CPT | Performed by: OBSTETRICS & GYNECOLOGY

## 2025-05-27 PROCEDURE — 87077 CULTURE AEROBIC IDENTIFY: CPT | Performed by: OBSTETRICS & GYNECOLOGY

## 2025-05-27 PROCEDURE — 87086 URINE CULTURE/COLONY COUNT: CPT | Performed by: OBSTETRICS & GYNECOLOGY

## 2025-05-27 PROCEDURE — 87210 SMEAR WET MOUNT SALINE/INK: CPT | Performed by: OBSTETRICS & GYNECOLOGY

## 2025-05-27 PROCEDURE — 99213 OFFICE O/P EST LOW 20 MIN: CPT | Performed by: OBSTETRICS & GYNECOLOGY

## 2025-05-27 RX ORDER — METRONIDAZOLE 500 MG/1
500 TABLET ORAL EVERY 12 HOURS SCHEDULED
Qty: 14 TABLET | Refills: 0 | Status: SHIPPED | OUTPATIENT
Start: 2025-05-27 | End: 2025-06-03

## 2025-05-27 NOTE — PROGRESS NOTES
PROBLEM GYNECOLOGICAL VISIT    Terese Mac is a 19 y.o. female who presents today with complaint of vaginal discomfort.  Her general medical history has been reviewed and she reports it as follows:    Past Medical History[1]  Past Surgical History[2]  OB History          0    Para   0    Term   0       0    AB   0    Living   0         SAB   0    IAB   0    Ectopic   0    Multiple   0    Live Births   0               Social History[3]  Social History     Substance and Sexual Activity   Sexual Activity Yes    Partners: Male    Birth control/protection: I.U.D.       Current Outpatient Medications   Medication Instructions    Glycopyrronium Tosylate 2.4 % PADS 1 Pad, Apply externally, Daily    Levonorgestrel (MIRENA) 20 MCG/DAY IUD 1 each, Once every 8 years - IUD    methylPREDNISolone 4 MG tablet therapy pack Use as directed on package       History of Present Illness:   Patient presents with c/o vaginal irritation.    Review of Systems:  Review of Systems   Genitourinary:  Negative for menstrual problem, pelvic pain, vaginal bleeding and vaginal discharge.        Vaginal/vulva irritation   All other systems reviewed and are negative.      Physical Exam:  /70 (BP Location: Left arm, Patient Position: Sitting, Cuff Size: Standard)   Pulse 69   Ht 5' (1.524 m)   Wt 52.8 kg (116 lb 6.4 oz)   LMP  (LMP Unknown)   SpO2 100%   BMI 22.73 kg/m²   Physical Exam  Constitutional:       Appearance: Normal appearance.   Genitourinary:      Bladder and urethral meatus normal.      No lesions in the vagina.      Right Labia: No rash, tenderness or lesions.     Left Labia: No tenderness, lesions or rash.     No vaginal discharge.      No cervical motion tenderness, discharge or lesion.      IUD strings visualized.      No urethral tenderness or mass present.     Neurological:      Mental Status: She is alert.   Vitals and nursing note reviewed.         Point of Care Testing:   -Wet mount:  abnormal       Assessment:   1. BV    Plan:   1. Cultures ordered: urine   2. Flagyl escribed   3. Return to office prn.   4. Patient's depression screening was assessed with a PHQ-2 score of 0. Clinically patient does not have depression. No treatment is required.      Reviewed with patient that test results are available in MyChart immediately, but that they will not necessarily be reviewed by me immediately.  Explained that I will review results at my earliest opportunity and contact patient appropriately.       [1]   Past Medical History:  Diagnosis Date    Anxiety     Congenital jaw deformity     surgically corrected 2020    Depression     Migraine     Pituitary tumor     Scoliosis     never req'd intervention   [2]   Past Surgical History:  Procedure Laterality Date    GENIOPLASTY Bilateral 9/8/2020    Procedure: GENOPLASTY, BILATERAL SAGGITTAL SPLIT OSTEOTOMY;  Surgeon: Choco De Leon DMD;  Location: [facility];  Service: Maxillofacial    MAXILLARY LE FORTE OSTEOTOMY Bilateral 9/8/2020    Procedure: OSTEOTOMY MAXILLARY LEFORTE I;  Surgeon: Choco De Leon DMD;  Location: [facility];  Service: Maxillofacial    MULTIPLE TOOTH EXTRACTIONS N/A 9/8/2020    Procedure: EXTRACTION TEETH IMPACTED, 1, 16, 17 ,32;  Surgeon: Choco D eLeon DMD;  Location: [facility];  Service: Maxillofacial    TONSILLECTOMY  2009   [3]   Social History  Tobacco Use    Smoking status: Never     Passive exposure: Never    Smokeless tobacco: Never   Vaping Use    Vaping status: Never Used   Substance Use Topics    Alcohol use: Yes     Comment: couple times/month    Drug use: Never

## 2025-05-27 NOTE — TELEPHONE ENCOUNTER
Pt calling for sooner appt, nothing at [facility] but offered to check other locations, Pt said yes but asked to put me on hold for a moment, I waited 3 minutes and had to disconnect as there are several calls in que. Nothing changed to Pt appt at this time.

## 2025-05-29 LAB — BACTERIA UR CULT: ABNORMAL

## 2025-06-02 DIAGNOSIS — Z97.5 BREAKTHROUGH BLEEDING WITH IUD: Primary | ICD-10-CM

## 2025-06-02 DIAGNOSIS — N92.1 BREAKTHROUGH BLEEDING WITH IUD: Primary | ICD-10-CM

## 2025-06-02 RX ORDER — NORETHINDRONE ACETATE AND ETHINYL ESTRADIOL .02; 1 MG/1; MG/1
1 TABLET ORAL DAILY
Qty: 24 TABLET | Refills: 2 | Status: SHIPPED | OUTPATIENT
Start: 2025-06-02

## 2025-06-03 DIAGNOSIS — R11.0 NAUSEA: Primary | ICD-10-CM

## 2025-06-03 DIAGNOSIS — R10.2 PELVIC PAIN: ICD-10-CM

## 2025-06-03 DIAGNOSIS — N94.6 DYSMENORRHEA: ICD-10-CM

## 2025-06-03 RX ORDER — ONDANSETRON 4 MG/1
4 TABLET, FILM COATED ORAL EVERY 8 HOURS PRN
Qty: 20 TABLET | Refills: 3 | Status: SHIPPED | OUTPATIENT
Start: 2025-06-03

## 2025-06-03 RX ORDER — IBUPROFEN 600 MG/1
600 TABLET, FILM COATED ORAL EVERY 6 HOURS PRN
Qty: 30 TABLET | Refills: 3 | Status: SHIPPED | OUTPATIENT
Start: 2025-06-03

## 2025-06-09 DIAGNOSIS — R39.9 UTI SYMPTOMS: Primary | ICD-10-CM

## 2025-06-09 RX ORDER — SULFAMETHOXAZOLE AND TRIMETHOPRIM 800; 160 MG/1; MG/1
1 TABLET ORAL EVERY 12 HOURS SCHEDULED
Qty: 14 TABLET | Refills: 0 | Status: SHIPPED | OUTPATIENT
Start: 2025-06-09 | End: 2025-06-16

## 2025-06-14 ENCOUNTER — APPOINTMENT (OUTPATIENT)
Dept: LAB | Facility: CLINIC | Age: 20
End: 2025-06-14
Payer: COMMERCIAL

## 2025-06-14 DIAGNOSIS — E61.1 IRON DEFICIENCY: ICD-10-CM

## 2025-06-14 LAB
ERYTHROCYTE [DISTWIDTH] IN BLOOD BY AUTOMATED COUNT: 12 % (ref 11.6–15.1)
HCT VFR BLD AUTO: 35.2 % (ref 34.8–46.1)
HGB BLD-MCNC: 11.8 G/DL (ref 11.5–15.4)
MCH RBC QN AUTO: 33.1 PG (ref 26.8–34.3)
MCHC RBC AUTO-ENTMCNC: 33.5 G/DL (ref 31.4–37.4)
MCV RBC AUTO: 99 FL (ref 82–98)
PLATELET # BLD AUTO: 273 THOUSANDS/UL (ref 149–390)
PMV BLD AUTO: 9.3 FL (ref 8.9–12.7)
RBC # BLD AUTO: 3.57 MILLION/UL (ref 3.81–5.12)
WBC # BLD AUTO: 4.87 THOUSAND/UL (ref 4.31–10.16)

## 2025-06-14 PROCEDURE — 85027 COMPLETE CBC AUTOMATED: CPT

## 2025-06-14 PROCEDURE — 36415 COLL VENOUS BLD VENIPUNCTURE: CPT

## 2025-06-24 ENCOUNTER — APPOINTMENT (OUTPATIENT)
Dept: LAB | Facility: HOSPITAL | Age: 20
End: 2025-06-24
Payer: COMMERCIAL

## 2025-06-24 DIAGNOSIS — N30.00 ACUTE CYSTITIS WITHOUT HEMATURIA: Primary | ICD-10-CM

## 2025-06-24 PROCEDURE — 87147 CULTURE TYPE IMMUNOLOGIC: CPT | Performed by: OBSTETRICS & GYNECOLOGY

## 2025-06-24 PROCEDURE — 87086 URINE CULTURE/COLONY COUNT: CPT | Performed by: OBSTETRICS & GYNECOLOGY

## 2025-06-24 RX ORDER — NITROFURANTOIN 25; 75 MG/1; MG/1
100 CAPSULE ORAL 2 TIMES DAILY
Qty: 14 CAPSULE | Refills: 0 | Status: SHIPPED | OUTPATIENT
Start: 2025-06-24 | End: 2025-07-01

## 2025-06-25 DIAGNOSIS — R39.9 UTI SYMPTOMS: Primary | ICD-10-CM

## 2025-06-25 LAB — BACTERIA UR CULT: ABNORMAL

## 2025-06-25 RX ORDER — AMOXICILLIN 500 MG/1
500 CAPSULE ORAL EVERY 12 HOURS SCHEDULED
Qty: 14 CAPSULE | Refills: 0 | Status: SHIPPED | OUTPATIENT
Start: 2025-06-25 | End: 2025-07-02

## 2025-06-25 RX ORDER — FLUCONAZOLE 150 MG/1
150 TABLET ORAL ONCE
Qty: 1 TABLET | Refills: 0 | Status: SHIPPED | OUTPATIENT
Start: 2025-06-25 | End: 2025-06-25

## 2025-06-27 ENCOUNTER — APPOINTMENT (OUTPATIENT)
Dept: LAB | Facility: CLINIC | Age: 20
End: 2025-06-27

## 2025-06-27 DIAGNOSIS — Z00.8 HEALTH EXAMINATION IN POPULATION SURVEYS: ICD-10-CM

## 2025-06-27 LAB
CHOLEST SERPL-MCNC: 122 MG/DL (ref ?–200)
EST. AVERAGE GLUCOSE BLD GHB EST-MCNC: 82 MG/DL
HBA1C MFR BLD: 4.5 %
HDLC SERPL-MCNC: 58 MG/DL
LDLC SERPL CALC-MCNC: 51 MG/DL (ref 0–100)
NONHDLC SERPL-MCNC: 64 MG/DL
TRIGL SERPL-MCNC: 66 MG/DL (ref ?–150)

## 2025-06-27 PROCEDURE — 36415 COLL VENOUS BLD VENIPUNCTURE: CPT

## 2025-06-27 PROCEDURE — 80061 LIPID PANEL: CPT

## 2025-06-27 PROCEDURE — 83036 HEMOGLOBIN GLYCOSYLATED A1C: CPT

## 2025-07-01 ENCOUNTER — TELEMEDICINE (OUTPATIENT)
Dept: FAMILY MEDICINE CLINIC | Facility: CLINIC | Age: 20
End: 2025-07-01
Payer: COMMERCIAL

## 2025-07-01 DIAGNOSIS — K59.09 OTHER CONSTIPATION: Primary | ICD-10-CM

## 2025-07-01 PROCEDURE — 99214 OFFICE O/P EST MOD 30 MIN: CPT | Performed by: FAMILY MEDICINE

## 2025-07-01 RX ORDER — POLYETHYLENE GLYCOL 3350 17 G/17G
17 POWDER, FOR SOLUTION ORAL DAILY
Qty: 30 EACH | Refills: 2 | Status: SHIPPED | OUTPATIENT
Start: 2025-07-01

## 2025-07-01 NOTE — PROGRESS NOTES
Virtual Regular Visit  Name: Terese Mac      : 2005      MRN: [id number]  Encounter Provider: Bharati Ashby MD  Encounter Date: 2025   Encounter department: [facility] PRIMARY CARE  :  Assessment & Plan  Other constipation  Patient new diagnosis symptomatic discussed with the patient increase fiber increase fluid intake increase physical activity recommend to take MiraLAX 1 packet a day dissolved in 8 hours of water and if symptom persistent to call the office  Orders:  •  polyethylene glycol (MIRALAX) 17 g packet; Take 17 g by mouth daily      Assessment & Plan        History of Present Illness     History of Present Illness  Patient virtual visit concerned about constipation  described her stool as a hard and she does not go to the bathroom every 3 to 4 days no pain with defecation no blood in the stool no abdomen pain no nausea vomiting or abdomen distention no change in her diet no personal or family history of colon cancer  Review of Systems   Constitutional:  Negative for activity change, appetite change, fatigue and fever.   HENT:  Negative for congestion, ear pain, sinus pressure, sinus pain and sore throat.    Eyes:  Negative for discharge and redness.   Respiratory:  Negative for cough, chest tightness and shortness of breath.    Cardiovascular:  Negative for chest pain, palpitations and leg swelling.   Gastrointestinal:  Positive for constipation. Negative for abdominal pain and diarrhea.   Genitourinary:  Negative for dysuria, flank pain, frequency and hematuria.   Musculoskeletal:  Negative for gait problem.   Skin:  Negative for pallor and rash.   Neurological:  Negative for dizziness, tremors, weakness, numbness and headaches.   Hematological:  Does not bruise/bleed easily.       Objective   Breastfeeding No     Physical Exam  Constitutional:       General: She is not in acute distress.     Appearance: She is well-developed. She is not ill-appearing, toxic-appearing  or diaphoretic.   HENT:      Head: Normocephalic.      Nose: Nose normal. No congestion or rhinorrhea.      Mouth/Throat:      Mouth: Mucous membranes are moist.      Pharynx: No oropharyngeal exudate or posterior oropharyngeal erythema.     Eyes:      General:         Right eye: No discharge.         Left eye: No discharge.      Conjunctiva/sclera: Conjunctivae normal.     Neck:      Vascular: No JVD.      Comments: No grossly visible mass Pulmonary:      Effort: Pulmonary effort is normal. No respiratory distress.      Breath sounds: No stridor. No wheezing.   Abdominal:      General: There is no distension.      Comments: Patient state her abdomen and a soft she also state no tender  Patient deny any visible or palpable bulging to suggest hernia  I was able to visualize abdomen and there is no change in the color no distension no visible mass     Musculoskeletal:         General: Normal range of motion.      Cervical back: Normal range of motion and neck supple.     Skin:     Findings: No erythema or rash.     Neurological:      Mental Status: She is alert and oriented to person, place, and time.     Psychiatric:         Mood and Affect: Mood normal.         Administrative Statements   Encounter provider Bharati Ashby MD    The Patient is located at Home and in the following state in which I hold an active license [place].    The patient was identified by name and date of birth. Terese Bass Mac was informed that this is a telemedicine visit and that the visit is being conducted through the Epic Embedded platform. She agrees to proceed..  My office door was closed. No one else was in the room.  She acknowledged consent and understanding of privacy and security of the video platform. The patient has agreed to participate and understands they can discontinue the visit at any time.    I have spent a total time of 15 minutes in caring for this patient on the day of the visit/encounter including Risks and benefits of tx  options, Instructions for management, and Importance of tx compliance, not including the time spent for establishing the audio/video connection.

## 2025-07-05 PROBLEM — K59.09 OTHER CONSTIPATION: Status: ACTIVE | Noted: 2025-07-05

## 2025-07-05 NOTE — ASSESSMENT & PLAN NOTE
Patient new diagnosis symptomatic discussed with the patient increase fiber increase fluid intake increase physical activity recommend to take MiraLAX 1 packet a day dissolved in 8 hours of water and if symptom persistent to call the office  Orders:  •  polyethylene glycol (MIRALAX) 17 g packet; Take 17 g by mouth daily

## 2025-07-07 DIAGNOSIS — K59.09 OTHER CONSTIPATION: ICD-10-CM

## 2025-07-07 DIAGNOSIS — R39.9 UTI SYMPTOMS: Primary | ICD-10-CM

## 2025-07-07 DIAGNOSIS — R61 EXCESSIVE SWEATING: Primary | ICD-10-CM

## 2025-07-08 ENCOUNTER — APPOINTMENT (OUTPATIENT)
Dept: LAB | Facility: CLINIC | Age: 20
End: 2025-07-08
Payer: COMMERCIAL

## 2025-07-08 ENCOUNTER — NURSE TRIAGE (OUTPATIENT)
Age: 20
End: 2025-07-08

## 2025-07-08 DIAGNOSIS — R61 EXCESSIVE SWEATING: Primary | ICD-10-CM

## 2025-07-08 PROCEDURE — 87147 CULTURE TYPE IMMUNOLOGIC: CPT | Performed by: OBSTETRICS & GYNECOLOGY

## 2025-07-08 PROCEDURE — 87186 SC STD MICRODIL/AGAR DIL: CPT | Performed by: OBSTETRICS & GYNECOLOGY

## 2025-07-08 PROCEDURE — 87086 URINE CULTURE/COLONY COUNT: CPT | Performed by: OBSTETRICS & GYNECOLOGY

## 2025-07-08 PROCEDURE — 87077 CULTURE AEROBIC IDENTIFY: CPT | Performed by: OBSTETRICS & GYNECOLOGY

## 2025-07-08 RX ORDER — POLYETHYLENE GLYCOL 3350 17 G/17G
17 POWDER, FOR SOLUTION ORAL DAILY
Qty: 30 EACH | Refills: 0 | OUTPATIENT
Start: 2025-07-08

## 2025-07-08 NOTE — TELEPHONE ENCOUNTER
I believe this was already refilled under another name.  Requested medication(s) are due for refill today: No  **If antibiotic or given during sick visit, contact patient to discuss current symptoms.   **Confirm prescribing provider    LOV:  5/13/2025  **If longer then 1 year, contact patient to schedule annual PRIOR to refilling. Once scheduled, adjust refill for 30 days, no refills.  **Update CareEverywhere to confirm not being seen elsewhere    NOV:  9/26/2025    Is patient due for annual visit: Yes, describe: no  **If future appointment, adjust to annual/follow up.  ** No appointment call to schedule annual/follow up.    Route to PCP, unless PCP no longer here, then physician they are seeing next.

## 2025-07-08 NOTE — TELEPHONE ENCOUNTER
No recommendations at this time.  I agree with reaching out to her eye doctor to determine if she needs another pair of glasses or if this is in the setting of eyestrain.  She can also follow-up with her PCP in the interim.

## 2025-07-08 NOTE — TELEPHONE ENCOUNTER
"REASON FOR CONVERSATION: Blurred Vision    SYMPTOMS: near vision blurry (worse when looking at computer screen at work,  feels may have eye strain) however distance vision is clear, mild headache, nausea    OTHER HEALTH INFORMATION: occurred x 3 weeks ago while riding in car on phone; has been to eye doctor several times since, was examined and given 2 sets of trials prescription glasses/contacts however near blurry vision persists. Said was told everything was fine however her \"eyes were overcorrected\".    CT 4/12 showed:   IMPRESSION:   No acute intracranial abnormality.   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    PROTOCOL DISPOSITION: See Within 1-4 Weeks in the Office (overriding See Within 2 Weeks in Office)    CARE ADVICE PROVIDED: Please call eye doctor with update that she may need different trial of prescription, Call back if symptoms worsen.    PRACTICE FOLLOW-UP: visit scheduled 8/15 with Dr. Spicer (patient needed later visit). Please provide recommendation if able (patient not seen since 11/9/2022      Reason for Disposition   Blurred vision or visual changes and gradual onset (e.g., weeks, months)    Answer Assessment - Initial Assessment Questions  1. DESCRIPTION: \"How has your vision changed?\" (e.g., complete vision loss, blurred vision, double vision, floaters, etc.)      Reported going on vacation x 3 weeks go and while in the car she was on her phone and noticed her distance vision was fine however her near vision was blurry despite putting in her lenses. This has persisted and seems to worsen when looking at her phone or computer screen at work.  2. LOCATION: \"One or both eyes?\" If one, ask: \"Which eye?\"      Both eyes  3. SEVERITY: \"Can you see anything?\" If Yes, ask: \"What can you see?\" (e.g., fine print)      Distance vision clear however near vision is blurry.  4. ONSET: \"When did this begin?\" \"Did it start suddenly or has this been gradual?\"      X 3 weeks ago while " "on her phone in the car  5. PATTERN: \"Does this come and go, or has it been constant since it started?\"      Reports going to eye doctor and they gave her trial 2 different sets of prescription glasses and contacts however near blurred vision persists  6. PAIN: \"Is there any pain in your eye(s)?\"  (Scale 1-10; or mild, moderate, severe)      Denies eye pain  7. CONTACTS-GLASSES: \"Do you wear contacts or glasses?\"      Yes, is seeing eye doctor for prescription changes; said was told everything looked good however her \"eyes were overcorrected\".   8. CAUSE: \"What do you think is causing this visual problem?\"      Computer screen at work, looking at phone may be be causing eye strain  9. OTHER SYMPTOMS: \"Do you have any other symptoms?\" (e.g., confusion, headache, arm or leg weakness, speech problems)      \"Slight headache\", nausea but said may have been from car ride). Denies eye pain, discharge from eye, eye swelling  10. PREGNANCY: \"Is there any chance you are pregnant?\" \"When was your last menstrual period?\"        Didn't ask    Protocols used: Vision Loss or Change-Adult-[place]    "

## 2025-07-09 DIAGNOSIS — B37.31 VAGINAL CANDIDIASIS: Primary | ICD-10-CM

## 2025-07-09 RX ORDER — FLUCONAZOLE 150 MG/1
150 TABLET ORAL ONCE
Qty: 1 TABLET | Refills: 0 | Status: SHIPPED | OUTPATIENT
Start: 2025-07-09 | End: 2025-07-09

## 2025-07-10 DIAGNOSIS — R39.9 UTI SYMPTOMS: Primary | ICD-10-CM

## 2025-07-10 DIAGNOSIS — E61.1 IRON DEFICIENCY: Primary | ICD-10-CM

## 2025-07-10 LAB — BACTERIA UR CULT: ABNORMAL

## 2025-07-10 RX ORDER — SULFAMETHOXAZOLE AND TRIMETHOPRIM 800; 160 MG/1; MG/1
1 TABLET ORAL EVERY 12 HOURS SCHEDULED
Qty: 20 TABLET | Refills: 0 | Status: SHIPPED | OUTPATIENT
Start: 2025-07-10 | End: 2025-07-20

## 2025-07-11 ENCOUNTER — APPOINTMENT (OUTPATIENT)
Dept: LAB | Facility: CLINIC | Age: 20
End: 2025-07-11
Payer: COMMERCIAL

## 2025-07-11 ENCOUNTER — CONSULT (OUTPATIENT)
Dept: GASTROENTEROLOGY | Facility: CLINIC | Age: 20
End: 2025-07-11

## 2025-07-11 VITALS
HEIGHT: 60 IN | BODY MASS INDEX: 22.78 KG/M2 | DIASTOLIC BLOOD PRESSURE: 70 MMHG | WEIGHT: 116 LBS | TEMPERATURE: 98.6 F | SYSTOLIC BLOOD PRESSURE: 112 MMHG

## 2025-07-11 DIAGNOSIS — K59.00 CONSTIPATION, UNSPECIFIED CONSTIPATION TYPE: ICD-10-CM

## 2025-07-11 DIAGNOSIS — E61.1 IRON DEFICIENCY: ICD-10-CM

## 2025-07-11 DIAGNOSIS — R63.0 DECREASED APPETITE: ICD-10-CM

## 2025-07-11 LAB
BASOPHILS # BLD AUTO: 0.03 THOUSANDS/ÂΜL (ref 0–0.1)
BASOPHILS NFR BLD AUTO: 1 % (ref 0–1)
EOSINOPHIL # BLD AUTO: 0.08 THOUSAND/ÂΜL (ref 0–0.61)
EOSINOPHIL NFR BLD AUTO: 2 % (ref 0–6)
ERYTHROCYTE [DISTWIDTH] IN BLOOD BY AUTOMATED COUNT: 11.7 % (ref 11.6–15.1)
FERRITIN SERPL-MCNC: 82 NG/ML (ref 30–307)
HCT VFR BLD AUTO: 38.3 % (ref 34.8–46.1)
HGB BLD-MCNC: 12.6 G/DL (ref 11.5–15.4)
IGA SERPL-MCNC: 186 MG/DL (ref 66–433)
IMM GRANULOCYTES # BLD AUTO: 0.01 THOUSAND/UL (ref 0–0.2)
IMM GRANULOCYTES NFR BLD AUTO: 0 % (ref 0–2)
IRON SATN MFR SERPL: 60 % (ref 15–50)
IRON SERPL-MCNC: 148 UG/DL (ref 50–212)
LYMPHOCYTES # BLD AUTO: 2 THOUSANDS/ÂΜL (ref 0.6–4.47)
LYMPHOCYTES NFR BLD AUTO: 39 % (ref 14–44)
MCH RBC QN AUTO: 32.4 PG (ref 26.8–34.3)
MCHC RBC AUTO-ENTMCNC: 32.9 G/DL (ref 31.4–37.4)
MCV RBC AUTO: 99 FL (ref 82–98)
MONOCYTES # BLD AUTO: 0.33 THOUSAND/ÂΜL (ref 0.17–1.22)
MONOCYTES NFR BLD AUTO: 7 % (ref 4–12)
NEUTROPHILS # BLD AUTO: 2.64 THOUSANDS/ÂΜL (ref 1.85–7.62)
NEUTS SEG NFR BLD AUTO: 51 % (ref 43–75)
NRBC BLD AUTO-RTO: 0 /100 WBCS
PLATELET # BLD AUTO: 242 THOUSANDS/UL (ref 149–390)
PMV BLD AUTO: 9.8 FL (ref 8.9–12.7)
RBC # BLD AUTO: 3.89 MILLION/UL (ref 3.81–5.12)
TIBC SERPL-MCNC: 246.4 UG/DL (ref 250–450)
TRANSFERRIN SERPL-MCNC: 176 MG/DL (ref 203–362)
UIBC SERPL-MCNC: 98 UG/DL (ref 155–355)
WBC # BLD AUTO: 5.09 THOUSAND/UL (ref 4.31–10.16)

## 2025-07-11 PROCEDURE — 83550 IRON BINDING TEST: CPT

## 2025-07-11 PROCEDURE — 83540 ASSAY OF IRON: CPT

## 2025-07-11 PROCEDURE — 82728 ASSAY OF FERRITIN: CPT

## 2025-07-11 PROCEDURE — 86364 TISS TRNSGLTMNASE EA IG CLAS: CPT

## 2025-07-11 PROCEDURE — 82784 ASSAY IGA/IGD/IGG/IGM EACH: CPT

## 2025-07-11 PROCEDURE — 85025 COMPLETE CBC W/AUTO DIFF WBC: CPT

## 2025-07-11 PROCEDURE — 36415 COLL VENOUS BLD VENIPUNCTURE: CPT

## 2025-07-11 RX ORDER — POLYETHYLENE GLYCOL 3350, SODIUM CHLORIDE, SODIUM BICARBONATE, POTASSIUM CHLORIDE 420; 11.2; 5.72; 1.48 G/4L; G/4L; G/4L; G/4L
4000 POWDER, FOR SOLUTION ORAL ONCE
Qty: 4000 ML | Refills: 0 | Status: SHIPPED | OUTPATIENT
Start: 2025-07-11 | End: 2025-07-11

## 2025-07-11 RX ORDER — SODIUM CHLORIDE, SODIUM LACTATE, POTASSIUM CHLORIDE, CALCIUM CHLORIDE 600; 310; 30; 20 MG/100ML; MG/100ML; MG/100ML; MG/100ML
125 INJECTION, SOLUTION INTRAVENOUS CONTINUOUS
OUTPATIENT
Start: 2025-07-11

## 2025-07-11 NOTE — PROGRESS NOTES
Name: Terese Mac      : 2005      MRN: [id number]  Encounter Provider: Vicky Maldonado PA-C  Encounter Date: 2025   Encounter department: [facility] [place]  :  Assessment & Plan  Iron deficiency  Patient noted to be iron deficient, most recent labs show hemoglobin 11.8, iron 100, ferritin 5.    She states she did have IUD placed 1 year ago and had bleeding almost constantly up until 2 months ago.  She is following with gynecologist for this and recently started on birth control.  She also notes her diet is poor and only eating about 1 meal a day.  Discussed that both of these could be the cause of her iron deficiency.  Patient states she has been having constipation, decreased appetite, 9 pound weight loss over the last 4 months.  Given patient's symptoms and iron deficiency will proceed with EGD/colonoscopy and checking celiac serologies.  Patient received 4 iron infusions, plan to obtain repeat labs prior to hematology visit next week.  Orders:    Ambulatory referral to Gastroenterology    Colonoscopy; Future    EGD; Future    polyethylene glycol-electrolytes (TriLyte) 4000 mL solution; Take 4,000 mL by mouth once for 1 dose Take 4000 mL by mouth once for 1 dose. Use as directed    Celiac Panel/Adult; Future    Decreased appetite  Plan EGD as above.  Orders:    Ambulatory referral to Gastroenterology    Constipation, unspecified constipation type  Recommend Benefiber OTC once daily, high fiber diet, increased water intake, and activity as tolerated to prevent/avoid constipation.   Start MiraLAX 1 capful daily.  Can increase to twice daily if needed.  I encourage patient to call if ongoing constipation, could consider Linzess.  Orders:    Ambulatory referral to Gastroenterology      Follow-up after procedures    History of Present Illness   Terese Mac is a 19 y.o. female with no significant PMH presenting for iron deficiency.  Patient noted to have low  ferritin, most recently ferritin was 5.  Patient was seen by hematology and there was no clear etiology of her iron deficiency, was recommended to see GI.    Patient states she has had constipation for most of her life but has been significantly worse recently.  She saw her PCP and was taking MiraLAX for a few days without improvement in symptoms.  She then used an enema a few days ago and was able to have a bowel movement.  Notes that her stool was very hard and she had to strain.  She did see a small amount of bright red blood on the stool.    She also notes chronic nausea and her appetite has been decreased.  States she has a history of bulimia years ago and has been dealing with intermittent nausea since then.  Notes she only eats about 1 meal a day.  She has been trying to work on this and is started eating breakfast.  She has lost 9 pounds over the past 4 months.    She states she had an IUD placed 1 year ago and has had constant bleeding for 1 year.  She reports having to use a tampon/pads daily for the past year.  Her bleeding stopped about 2 months ago.  She is seeing her gynecologist and was started on birth control to help with this.  She has received 4 iron infusions, plan to get repeat labs prior to hematology appointment next week.  No frequent NSAIDs/aspirin use, hx gastric bypass frequent blood donations, melena/hematochezia/hematemesis, FHx GI cancers.      Labs 6/2025 - hemoglobin 11.8, MCV 99, platelets 273  Labs 3/2025 - iron saturation 31, iron 100, ferritin 5  Labs 1/2025 -  iron saturation 35, iron 117, ferritin 5    Review of Systems   Constitutional:  Negative for appetite change, chills and fever.   Respiratory:  Negative for shortness of breath.    Gastrointestinal:  Positive for constipation and nausea. Negative for abdominal pain, blood in stool, diarrhea and vomiting.     Medical History Reviewed by provider this encounter:  Tobacco  Allergies  Meds  Problems  Med Hx  Surg Hx  Fam  Hx     .  Medications Ordered Prior to Encounter[1]   Social History[2]     Objective   /70 (BP Location: Right arm, Patient Position: Sitting, Cuff Size: Large)   Temp 98.6 °F (37 °C) (Tympanic)   Ht 5' (1.524 m)   Wt 52.6 kg (116 lb)   BMI 22.65 kg/m²      Physical Exam  Vitals reviewed.   Constitutional:       General: She is not in acute distress.     Appearance: She is not ill-appearing.   HENT:      Head: Normocephalic and atraumatic.     Cardiovascular:      Rate and Rhythm: Normal rate and regular rhythm.   Pulmonary:      Effort: Pulmonary effort is normal.      Breath sounds: Normal breath sounds.   Abdominal:      General: Abdomen is flat. Bowel sounds are normal. There is no distension.      Palpations: Abdomen is soft.      Tenderness: There is no abdominal tenderness.     Skin:     General: Skin is warm and dry.     Neurological:      Mental Status: She is alert. Mental status is at baseline.                  [1]   Current Outpatient Medications on File Prior to Visit   Medication Sig Dispense Refill    Glycopyrronium Tosylate 2.4 % PADS Apply 1 Pad topically in the morning 30 each 1    ibuprofen (MOTRIN) 600 mg tablet Take 1 tablet (600 mg total) by mouth every 6 (six) hours as needed for mild pain 30 tablet 3    Levonorgestrel (MIRENA) 20 MCG/DAY IUD 1 each by Intrauterine Device route Once every 8 years      ondansetron (ZOFRAN) 4 mg tablet Take 1 tablet (4 mg total) by mouth every 8 (eight) hours as needed for nausea or vomiting 20 tablet 3    polyethylene glycol (MIRALAX) 17 g packet Take 17 g by mouth daily 30 each 2    sulfamethoxazole-trimethoprim (BACTRIM DS) 800-160 mg per tablet Take 1 tablet by mouth every 12 (twelve) hours for 10 days 20 tablet 0    norethindrone-ethinyl estradiol (Junel 1/20) 1-20 MG-MCG per tablet Take 1 tablet by mouth daily (Patient not taking: Reported on 7/11/2025) 24 tablet 2     No current facility-administered medications on file prior to visit.   [2]    Social History  Tobacco Use    Smoking status: Never     Passive exposure: Never    Smokeless tobacco: Never   Vaping Use    Vaping status: Never Used   Substance and Sexual Activity    Alcohol use: Yes     Comment: couple times/month    Drug use: Never    Sexual activity: Yes     Partners: Male     Birth control/protection: I.U.D.

## 2025-07-11 NOTE — PATIENT INSTRUCTIONS
Scheduled date of EGD/colonoscopy (as of today):09/05/2025  Physician performing EGD/colonoscopy:Deyanira  Location of EGD/colonoscopy:[place]  Desired bowel prep reviewed with patient:Mj Gilman  Instructions reviewed with patient by:KARON  Clearances:  NONE    Patient will follow up  on 10/20/2025 at 3:00 pm with Vicky in [place]    Patient will have labs done as well

## 2025-07-11 NOTE — ASSESSMENT & PLAN NOTE
Patient noted to be iron deficient, most recent labs show hemoglobin 11.8, iron 100, ferritin 5.    She states she did have IUD placed 1 year ago and had bleeding almost constantly up until 2 months ago.  She is following with gynecologist for this and recently started on birth control.  She also notes her diet is poor and only eating about 1 meal a day.  Discussed that both of these could be the cause of her iron deficiency.  Patient states she has been having constipation, decreased appetite, 9 pound weight loss over the last 4 months.  Given patient's symptoms and iron deficiency will proceed with EGD/colonoscopy and checking celiac serologies.  Patient received 4 iron infusions, plan to obtain repeat labs prior to hematology visit next week.  Orders:    Ambulatory referral to Gastroenterology    Colonoscopy; Future    EGD; Future    polyethylene glycol-electrolytes (TriLyte) 4000 mL solution; Take 4,000 mL by mouth once for 1 dose Take 4000 mL by mouth once for 1 dose. Use as directed    Celiac Panel/Adult; Future

## 2025-07-14 ENCOUNTER — APPOINTMENT (OUTPATIENT)
Dept: LAB | Facility: HOSPITAL | Age: 20
End: 2025-07-14
Payer: COMMERCIAL

## 2025-07-14 DIAGNOSIS — Z11.3 SCREEN FOR STD (SEXUALLY TRANSMITTED DISEASE): Primary | ICD-10-CM

## 2025-07-14 PROCEDURE — 87563 M. GENITALIUM AMP PROBE: CPT | Performed by: NURSE PRACTITIONER

## 2025-07-14 PROCEDURE — 87661 TRICHOMONAS VAGINALIS AMPLIF: CPT | Performed by: NURSE PRACTITIONER

## 2025-07-14 RX ORDER — GLYCOPYRRONIUM 2.4 G/100G
CLOTH TOPICAL
Qty: 60 EACH | Refills: 0 | Status: SHIPPED | OUTPATIENT
Start: 2025-07-14

## 2025-07-15 LAB
M GENITALIUM DNA SPEC QL NAA+PROBE: NEGATIVE
T VAGINALIS DNA SPEC QL NAA+PROBE: NEGATIVE

## 2025-07-17 LAB — TTG IGA SER IA-ACNC: <0.4 U/ML (ref ?–10)

## 2025-07-18 ENCOUNTER — OFFICE VISIT (OUTPATIENT)
Dept: HEMATOLOGY ONCOLOGY | Facility: CLINIC | Age: 20
End: 2025-07-18
Payer: COMMERCIAL

## 2025-07-18 VITALS
SYSTOLIC BLOOD PRESSURE: 108 MMHG | WEIGHT: 120 LBS | HEIGHT: 60 IN | BODY MASS INDEX: 23.56 KG/M2 | TEMPERATURE: 98.1 F | HEART RATE: 85 BPM | OXYGEN SATURATION: 98 % | DIASTOLIC BLOOD PRESSURE: 70 MMHG | RESPIRATION RATE: 17 BRPM

## 2025-07-18 DIAGNOSIS — E53.8 FOLATE DEFICIENCY: ICD-10-CM

## 2025-07-18 DIAGNOSIS — E83.19 IRON OVERLOAD: Primary | ICD-10-CM

## 2025-07-18 DIAGNOSIS — E55.9 VITAMIN D DEFICIENCY: ICD-10-CM

## 2025-07-18 DIAGNOSIS — E53.8 VITAMIN B12 DEFICIENCY: ICD-10-CM

## 2025-07-18 PROCEDURE — 99213 OFFICE O/P EST LOW 20 MIN: CPT

## 2025-07-18 NOTE — PROGRESS NOTES
Name: Terese Mac      : 2005      MRN: [id number]  Encounter Provider: AMY Vasquez  Encounter Date: 2025   Encounter department: [facility] [place]  :  Assessment & Plan  Vitamin D deficiency    Orders:    Vitamin D 25 hydroxy    Vitamin B12 deficiency    Orders:    Vitamin B12    Folate deficiency    Orders:    Folate    Iron overload    Orders:    Hemochromatosis mutation; Future    19-year-old female with iron deficiency.  She had a great response to the IV iron however, her iron saturation is at 60% therefore, would like to rule out hemochromatosis.  Patient is agreeable.  To evaluate her fatigue further, we will check vitamin D, vitamin B12 and folate levels.  I will call her with the results.    We will see her in the office in 4 months with labs prior (CBC, iron panel).  Patient is agreeable with the plan.  She is aware to contact us for any additional questions/concerns or worsening symptoms.    Return in about 4 months (around 2025).    History of Present Illness   Chief Complaint   Patient presents with    Follow-up     Pertinent Medical History   25: Terese Mac is a 19-year-old female who presents for initial consultation of her iron deficiency.  She is here with her mother today.    Patient reports she had an IUD placed last year and she bled every single day having to change her pad/tampon every 4 hours for 1 full year.  About 2 months she started getting a monthly menstrual cycle for the last 7 days with 4 days on the heavier side having to change her pad/tampon every 2-3 hours.    Patient did trial oral iron supplements however, she did not tolerate them.  She eats about 1 meal a day and is experiencing nausea.  She has chronic constipation and has a BM every 3 days.    Patient endorses fatigue, dizziness on ambulation, increased frequency of headaches.  Denies any RLS, PICA, CP, SOB, palpitations.  Patient denies abnormal  bleeding: epistaxis, gingival bleeding, hematuria, dark tarry stools.    Patient is not a vegetarian/vegan.      Labs: Vitamin B12 402, serum iron 117, iron saturation 35%, ferritin 5      Family history:  Mother = breast cancer, anemia and menorrhagia  Renal great-grandmother = uterine cancer      07/18/25: Patient presents for follow-up of her iron deficiency.  She is status post IV iron treatment, Venofer 200 mg weekly x 4 doses (4/4/2025 to 4/25/2025).  Patient is not on any supplementation due to her constipation.    She did have a GI evaluation, she is scheduled for an EGD/colonoscopy 9/5/2025.  She reports that constipation has worsened.  GI had recommended she take MiraLAX daily.  There was a point since her last visit where she did not have a bowel movement for 3 weeks and had to proceed with an enema.  Since then, she has been taking MiraLAX once a day and she has been having a bowel movement maybe every 2 to 3 days.    Patient continues to endorse fatigue and increased frequency of headaches.  Denies any dizziness, lightheadedness, CP, SOB, palpitations.  Patient denies abnormal bleeding: epistaxis, gingival bleeding, hematuria, dark tarry stools.    She has an IUD in place and reports that she has only spotted last month.  She is getting to a point where she is not having her menstrual cycles monthly.    Labs:  3/22/2025: Vitamin B12 402, serum iron 117, iron saturation 35%, ferritin 5     7/11/2025: Hgb 12.6, MCV 99, WBC 5.09, platelets 242,000, serum iron 148, iron saturation 60%, ferritin 82       Review of Systems   Constitutional:  Positive for appetite change, fatigue and unexpected weight change. Negative for activity change, diaphoresis and fever.   HENT:  Negative for nosebleeds.    Eyes: Negative.    Respiratory:  Negative for cough, chest tightness and shortness of breath.    Cardiovascular:  Negative for chest pain, palpitations and leg swelling.   Gastrointestinal:  Positive for constipation  (worsening). Negative for abdominal pain and blood in stool.   Endocrine: Negative.    Genitourinary:  Negative for hematuria.   Musculoskeletal: Negative.    Skin: Negative.    Neurological:  Positive for headaches. Negative for dizziness and light-headedness.   Hematological: Negative.            Objective   /70 (BP Location: Left arm, Patient Position: Sitting, Cuff Size: Adult)   Pulse 85   Temp 98.1 °F (36.7 °C) (Temporal)   Resp 17   Ht 5' (1.524 m)   Wt 54.4 kg (120 lb)   SpO2 98%   BMI 23.44 kg/m²     Physical Exam  Constitutional:       Appearance: Normal appearance.   HENT:      Head: Normocephalic and atraumatic.     Musculoskeletal:      Right lower leg: No edema.      Left lower leg: No edema.     Skin:     General: Skin is warm and dry.     Neurological:      Mental Status: She is alert and oriented to person, place, and time.     Psychiatric:         Mood and Affect: Mood normal.         Behavior: Behavior normal.         Thought Content: Thought content normal.         Judgment: Judgment normal.         Labs: I have reviewed the following labs:  Results for orders placed or performed in visit on 07/14/25   Trichomonas vaginalis/Mycoplasma genitalium PCR   Result Value Ref Range    Trichomonas vaginalis Negative Negative    Mycoplasma genitalium Negative Negative   I spent 20 minutes in chart review, counseling, coordination of care, and documentation.    This note has been generated by voice recognition software system.  Therefore, there may be spelling, grammar, and or syntax errors. Please contact if questions arise.

## 2025-07-23 ENCOUNTER — OFFICE VISIT (OUTPATIENT)
Dept: OBGYN CLINIC | Facility: CLINIC | Age: 20
End: 2025-07-23

## 2025-07-23 VITALS — WEIGHT: 121 LBS | DIASTOLIC BLOOD PRESSURE: 62 MMHG | SYSTOLIC BLOOD PRESSURE: 90 MMHG | BODY MASS INDEX: 23.63 KG/M2

## 2025-07-23 DIAGNOSIS — R39.9 UTI SYMPTOMS: ICD-10-CM

## 2025-07-23 DIAGNOSIS — N89.8 VAGINA ITCHING: Primary | ICD-10-CM

## 2025-07-23 PROBLEM — E55.9 VITAMIN D DEFICIENCY: Status: RESOLVED | Noted: 2025-07-18 | Resolved: 2025-07-23

## 2025-07-23 PROBLEM — K59.09 OTHER CONSTIPATION: Status: RESOLVED | Noted: 2025-07-05 | Resolved: 2025-07-23

## 2025-07-23 PROBLEM — J02.9 PHARYNGITIS: Status: RESOLVED | Noted: 2025-05-13 | Resolved: 2025-07-23

## 2025-07-23 PROBLEM — E83.19 IRON OVERLOAD: Status: RESOLVED | Noted: 2025-07-18 | Resolved: 2025-07-23

## 2025-07-23 PROBLEM — R41.0 CONFUSION: Status: RESOLVED | Noted: 2025-01-15 | Resolved: 2025-07-23

## 2025-07-23 PROBLEM — R61 EXCESSIVE SWEATING: Status: RESOLVED | Noted: 2025-03-27 | Resolved: 2025-07-23

## 2025-07-23 PROBLEM — E53.8 VITAMIN B12 DEFICIENCY: Status: RESOLVED | Noted: 2025-07-18 | Resolved: 2025-07-23

## 2025-07-23 PROCEDURE — 81003 URINALYSIS AUTO W/O SCOPE: CPT | Performed by: NURSE PRACTITIONER

## 2025-07-23 PROCEDURE — 87480 CANDIDA DNA DIR PROBE: CPT | Performed by: NURSE PRACTITIONER

## 2025-07-23 PROCEDURE — 87660 TRICHOMONAS VAGIN DIR PROBE: CPT | Performed by: NURSE PRACTITIONER

## 2025-07-23 PROCEDURE — 87086 URINE CULTURE/COLONY COUNT: CPT | Performed by: NURSE PRACTITIONER

## 2025-07-23 PROCEDURE — 87510 GARDNER VAG DNA DIR PROBE: CPT | Performed by: NURSE PRACTITIONER

## 2025-07-23 PROCEDURE — 99213 OFFICE O/P EST LOW 20 MIN: CPT | Performed by: NURSE PRACTITIONER

## 2025-07-23 NOTE — PROGRESS NOTES
PROBLEM GYNECOLOGICAL VISIT    Terese Mac is a 19 y.o. female who presents today with complaint of dysuria and vaginal itching.  Her general medical history has been reviewed and she reports it as follows:    Past Medical History[1]  Past Surgical History[2]  OB History          0    Para   0    Term   0       0    AB   0    Living   0         SAB   0    IAB   0    Ectopic   0    Multiple   0    Live Births   0               Social History[3]  Social History     Substance and Sexual Activity   Sexual Activity Yes    Partners: Male    Birth control/protection: I.U.D.       Current Outpatient Medications   Medication Instructions    Glycopyrronium Tosylate 2.4 % PADS 1 Pad, Apply externally, Daily    ibuprofen (MOTRIN) 600 mg, Oral, Every 6 hours PRN    Levonorgestrel (MIRENA) 20 MCG/DAY IUD 1 each, Once every 8 years - IUD    ondansetron (ZOFRAN) 4 mg, Oral, Every 8 hours PRN    Qbrexza 2.4 % PADS APPLY 1 PAD TOPICALLY DAILY       History of Present Illness:   Reports that she persists with low level vaginal itching/irritation despite having been treated with Diflucan 2 weeks ago.  States that she also continues with UTI symptoms of urinary frequency, urgency and difficulty urinating.  Denies outright dysuria but reports bladder discomfort.  She had + urine culture on 2025 for >100,000 cfu/ml Staph and did not take antibiotics for it - was advised likely just contamination.  She screened negative for trichomonas and mycoplasma genitalium on 2025 and for GC and CT on 2025.  She is sexually active in a monogamous relationship.    Review of Systems:  Review of Systems   Constitutional: Negative.    Genitourinary:  Positive for difficulty urinating, frequency, urgency and vaginal pain (itching/irritation). Negative for dysuria, pelvic pain and vaginal bleeding.       Physical Exam:  BP 90/62 (Patient Position: Sitting, Cuff Size: Standard)   Wt 54.9 kg (121 lb)   BMI 23.63 kg/m²    Physical Exam  Constitutional:       General: She is not in acute distress.  Genitourinary:      No lesions in the vagina.      Vulva exam comments: Normal.      No vaginal discharge or erythema.     Neurological:      Mental Status: She is alert.     Skin:     General: Skin is warm and dry.   Vitals reviewed.       Point of Care Testing:   -urine dipstick: neg leuks, neg nitrites, neg blood    Assessment:   1. Vaginal irritation.   2. UTI symptoms.    Plan:   1. Cultures ordered: urine, vaginitis DNA probe.   2. Return to office as previously scheduled for annual GYN exam in 9/2025.    Reviewed with patient that test results are available in [facility] immediately, but that they will not necessarily be reviewed by me immediately.  Explained that I will review results at my earliest opportunity and contact patient appropriately.         [1]   Past Medical History:  Diagnosis Date    Anxiety     Congenital jaw deformity     surgically corrected 2020    Depression     Migraine     Pituitary tumor     Scoliosis     never req'd intervention   [2]   Past Surgical History:  Procedure Laterality Date    GENIOPLASTY Bilateral 9/8/2020    Procedure: GENOPLASTY, BILATERAL SAGGITTAL SPLIT OSTEOTOMY;  Surgeon: Choco De Leon DMD;  Location: [facility];  Service: Maxillofacial    MAXILLARY LE FORTE OSTEOTOMY Bilateral 9/8/2020    Procedure: OSTEOTOMY MAXILLARY LEFORTE I;  Surgeon: Choco De Leon DMD;  Location: [facility];  Service: Maxillofacial    MULTIPLE TOOTH EXTRACTIONS N/A 9/8/2020    Procedure: EXTRACTION TEETH IMPACTED, 1, 16, 17 ,32;  Surgeon: Choco De Leon DMD;  Location: [facility];  Service: Maxillofacial    TONSILLECTOMY  2009   [3]   Social History  Tobacco Use    Smoking status: Never     Passive exposure: Never    Smokeless tobacco: Never   Vaping Use    Vaping status: Never Used   Substance Use Topics    Alcohol use: Yes     Comment: couple times/month    Drug use: Never

## 2025-07-25 LAB
CANDIDA RRNA VAG QL PROBE: NOT DETECTED
G VAGINALIS RRNA GENITAL QL PROBE: NOT DETECTED
T VAGINALIS RRNA GENITAL QL PROBE: NOT DETECTED

## 2025-07-26 LAB — BACTERIA UR CULT: NORMAL

## 2025-07-29 ENCOUNTER — TELEPHONE (OUTPATIENT)
Dept: PSYCHOLOGY | Facility: CLINIC | Age: 20
End: 2025-07-29

## 2025-07-29 ENCOUNTER — HOSPITAL ENCOUNTER (EMERGENCY)
Facility: HOSPITAL | Age: 20
Discharge: HOME | End: 2025-07-29
Attending: EMERGENCY MEDICINE | Admitting: EMERGENCY MEDICINE
Payer: COMMERCIAL

## 2025-07-29 VITALS
DIASTOLIC BLOOD PRESSURE: 74 MMHG | WEIGHT: 115.3 LBS | BODY MASS INDEX: 22.52 KG/M2 | SYSTOLIC BLOOD PRESSURE: 119 MMHG | RESPIRATION RATE: 20 BRPM | HEART RATE: 95 BPM | TEMPERATURE: 98.5 F | OXYGEN SATURATION: 98 %

## 2025-07-29 DIAGNOSIS — F32.1 CURRENT MODERATE EPISODE OF MAJOR DEPRESSIVE DISORDER, UNSPECIFIED WHETHER RECURRENT (HCC): Primary | ICD-10-CM

## 2025-07-29 DIAGNOSIS — F32.A DEPRESSION: ICD-10-CM

## 2025-07-29 PROCEDURE — 99284 EMERGENCY DEPT VISIT MOD MDM: CPT | Performed by: EMERGENCY MEDICINE

## 2025-07-29 PROCEDURE — 99283 EMERGENCY DEPT VISIT LOW MDM: CPT

## 2025-08-19 ENCOUNTER — TELEPHONE (OUTPATIENT)
Age: 20
End: 2025-08-19

## (undated) DEVICE — STERILE MANDIBLE PACK: Brand: CARDINAL HEALTH

## (undated) DEVICE — IV CATH 14 G SAFETY

## (undated) DEVICE — PROXIMATE SKIN STAPLERS (35 WIDE) CONTAINS 35 STAINLESS STEEL STAPLES (FIXED HEAD): Brand: PROXIMATE

## (undated) DEVICE — DRAPE SHEET THREE QUARTER

## (undated) DEVICE — DENTAL BURR SIDE WHITE

## (undated) DEVICE — SPONGE STICK WITH PVP-I: Brand: KENDALL

## (undated) DEVICE — SUT CHROMIC 3-0 RB-1 27 IN U204H

## (undated) DEVICE — UTILITY MARKER,BLACK WITH LABELS: Brand: DEVON

## (undated) DEVICE — MATRIX 1.4MM DRILL BIT/J-LATCH CALIBRATED F/BASIC TROCAR SYS: Brand: MATRIX

## (undated) DEVICE — PENCIL ELECTROSURG E-Z CLEAN -0035H

## (undated) DEVICE — BULB SYRINGE,IRRIGATION WITH PROTECTIVE CAP: Brand: DOVER

## (undated) DEVICE — INTENDED FOR TISSUE SEPARATION, AND OTHER PROCEDURES THAT REQUIRE A SHARP SURGICAL BLADE TO PUNCTURE OR CUT.: Brand: BARD-PARKER ® CARBON RIB-BACK BLADES

## (undated) DEVICE — TRAY FOLEY 16FR SURESTEP TEMP SENS URIMETER STAT LOK

## (undated) DEVICE — Device

## (undated) DEVICE — ELECTRODE NEEDLE MEGAFINE 2IN E-Z CLEAN MEGADYNE -0118

## (undated) DEVICE — NEURO PATTIES 1/2 X 3

## (undated) DEVICE — SYRINGE 20ML LL

## (undated) DEVICE — CORNEAL PROTECTOR ADULT

## (undated) DEVICE — GLOVE SRG BIOGEL 8

## (undated) DEVICE — SUT ETHILON 5-0 P-3 18 IN 698H

## (undated) DEVICE — DENTAL BURR ROUND WHITE

## (undated) DEVICE — SUT CHROMIC 3-0 SH 27 IN G122H

## (undated) DEVICE — GLOVE INDICATOR PI UNDERGLOVE SZ 8.5 BLUE

## (undated) DEVICE — 1840 FOAM BLOCK NEEDLE COUNTER: Brand: DEVON

## (undated) DEVICE — SUT ETHIBOND 1 OS-4 R/C 30 IN X518H

## (undated) DEVICE — SYRINGE 10ML LL

## (undated) DEVICE — ELECTRODE BLADE MOD E-Z CLEAN 2.5IN 6.4CM -0012M

## (undated) DEVICE — MAYO STAND COVER: Brand: CONVERTORS

## (undated) DEVICE — MICROSCOPE SLIDES: Brand: BIOSEAL INC.

## (undated) DEVICE — MATRIX 1.4MM DRILL BIT J-LATCH/6MM STOP/44.5MM: Brand: MATRIX

## (undated) DEVICE — BATTERY PACK-STERILE FOR BATTERY POWERED DRIVER

## (undated) DEVICE — SUT VICRYL PLUS 3-0 RB-1 CR/8 18 IN VCP713D

## (undated) DEVICE — BURR OVAL 4 MM C0901

## (undated) DEVICE — NEEDLE 25G X 1 1/2

## (undated) DEVICE — BLADE MICRO RECIP 5053-220

## (undated) DEVICE — STERILE TOOTHBRUSH: Brand: CENTURION